# Patient Record
Sex: FEMALE | Race: BLACK OR AFRICAN AMERICAN | NOT HISPANIC OR LATINO | Employment: OTHER | URBAN - METROPOLITAN AREA
[De-identification: names, ages, dates, MRNs, and addresses within clinical notes are randomized per-mention and may not be internally consistent; named-entity substitution may affect disease eponyms.]

---

## 2023-04-17 ENCOUNTER — OFFICE VISIT (OUTPATIENT)
Dept: PRIMARY CARE | Facility: CLINIC | Age: 83
End: 2023-04-17
Payer: MEDICARE

## 2023-04-17 VITALS
OXYGEN SATURATION: 98 % | HEART RATE: 83 BPM | TEMPERATURE: 96 F | WEIGHT: 141.5 LBS | BODY MASS INDEX: 26.04 KG/M2 | DIASTOLIC BLOOD PRESSURE: 70 MMHG | SYSTOLIC BLOOD PRESSURE: 132 MMHG | RESPIRATION RATE: 16 BRPM | HEIGHT: 62 IN

## 2023-04-17 DIAGNOSIS — Z00.00 ROUTINE GENERAL MEDICAL EXAMINATION AT HEALTH CARE FACILITY: Primary | ICD-10-CM

## 2023-04-17 DIAGNOSIS — Z13.89 ENCOUNTER FOR SCREENING FOR OTHER DISORDER: ICD-10-CM

## 2023-04-17 DIAGNOSIS — Z78.0 ASYMPTOMATIC MENOPAUSE: ICD-10-CM

## 2023-04-17 DIAGNOSIS — Z00.00 ENCOUNTER FOR MEDICARE ANNUAL WELLNESS EXAM: ICD-10-CM

## 2023-04-17 DIAGNOSIS — N18.31 CHRONIC KIDNEY DISEASE, STAGE 3A (MULTI): ICD-10-CM

## 2023-04-17 DIAGNOSIS — M25.579 ANKLE PAIN, UNSPECIFIED CHRONICITY, UNSPECIFIED LATERALITY: ICD-10-CM

## 2023-04-17 PROBLEM — H90.3 BILATERAL HIGH FREQUENCY SENSORINEURAL HEARING LOSS: Status: ACTIVE | Noted: 2023-04-17

## 2023-04-17 PROBLEM — M99.09 SEGMENTAL AND SOMATIC DYSFUNCTION: Status: ACTIVE | Noted: 2023-04-17

## 2023-04-17 PROBLEM — M79.9 POSTURAL STRAIN: Status: ACTIVE | Noted: 2023-04-17

## 2023-04-17 PROBLEM — M53.3 SACROILIAC DYSFUNCTION: Status: ACTIVE | Noted: 2023-04-17

## 2023-04-17 PROBLEM — M79.18 DIFFUSE MYOFASCIAL PAIN SYNDROME: Status: ACTIVE | Noted: 2023-04-17

## 2023-04-17 PROBLEM — M19.049 ARTHROPATHY OF HAND: Status: ACTIVE | Noted: 2023-04-17

## 2023-04-17 PROBLEM — E78.5 HLD (HYPERLIPIDEMIA): Status: ACTIVE | Noted: 2023-04-17

## 2023-04-17 PROBLEM — M85.80 OSTEOPENIA: Status: ACTIVE | Noted: 2023-04-17

## 2023-04-17 ASSESSMENT — PATIENT HEALTH QUESTIONNAIRE - PHQ9
SUM OF ALL RESPONSES TO PHQ9 QUESTIONS 1 AND 2: 0
2. FEELING DOWN, DEPRESSED OR HOPELESS: NOT AT ALL
1. LITTLE INTEREST OR PLEASURE IN DOING THINGS: NOT AT ALL

## 2023-04-17 ASSESSMENT — ACTIVITIES OF DAILY LIVING (ADL)
MANAGING_FINANCES: INDEPENDENT
DRESSING: INDEPENDENT
TAKING_MEDICATION: INDEPENDENT
DOING_HOUSEWORK: INDEPENDENT
GROCERY_SHOPPING: INDEPENDENT
BATHING: INDEPENDENT

## 2023-04-17 ASSESSMENT — LIFESTYLE VARIABLES: HOW MANY STANDARD DRINKS CONTAINING ALCOHOL DO YOU HAVE ON A TYPICAL DAY: PATIENT DOES NOT DRINK

## 2023-04-17 NOTE — PROGRESS NOTES
"Subjective   Reason for Visit: Jovanna Bucio is an 82 y.o. female here for a Medicare Wellness visit.     Past Medical, Surgical, and Family History reviewed and updated in chart.      Reviewed all medications by prescribing practitioner or clinical pharmacist (such as prescriptions, OTCs, herbal therapies and supplements) and documented in the medical record.    HPI    Ankle pain- harvey while jump  Trying to work on ankle home exercise  Followed by podiatry who checked dopplers and were ok  Would like to see foot and ankle team for her ankle pain    Right leg pain  Worsened when sleeping on the couch  Today has resolved      Patient Care Team:  Khloe Hawkins DO as PCP - General  Khloe Hawkins DO as PCP - [facility] Attributed Provider     Review of Systems  All systems reviewed and neg if not noted in the HPI above   Objective   Vitals:  /70 (Patient Position: Sitting)   Pulse 83   Temp 35.6 °C (96 °F)   Resp 16   Ht 1.575 m (5' 2\")   Wt 64.2 kg (141 lb 8 oz)   SpO2 98%   BMI 25.88 kg/m²       Physical Exam      Pleasant  Eyes: conjunctiva non-icteric and eye lids are without obvious rash or drooping. Pupils are symmetric.   Ears, Nose, Mouth, and Throat: External ears and nose appear to be without deformity or rash. No lesions or masses noted. Hearing is grossly intact.   CV: RRR, no murmur  Carotids: no bruits  Pulm:CTA B/L  Abd: soft, NTTP, + BS  LE: no edema  Psychiatric: Alert, orientation to person, place, and time. Recent/remote memory as evidenced through face-to-face interaction and discussion appear grossly intact. Mood and affect are normal.          Assessment/Plan   Problem List Items Addressed This Visit          Genitourinary    Chronic kidney disease, stage 3a    Current Assessment & Plan     Stable. Continue to monitor  Checking today         Relevant Orders    CBC and Auto Differential     Other Visit Diagnoses       Routine general medical examination at health care facility   "  -  Primary    Encounter for Medicare annual wellness exam        Relevant Orders    CBC and Auto Differential    Comprehensive Metabolic Panel    Lipid Panel    TSH with reflex to Free T4 if abnormal    Referral to Community Health Worker - [facility]    Encounter for screening for other disorder        Ankle pain, unspecified chronicity, unspecified laterality        Relevant Orders    Referral to Podiatry    Asymptomatic menopause        Relevant Orders    XR DEXA bone density

## 2023-04-17 NOTE — PATIENT INSTRUCTIONS
Hand numbness  - sounds like carpel tunnel  - try otc splint    Ankle pain  - referral for podiatry    Labs today    IBS???  - continue to monitor  - whole food plant based diet can help        Continue to work on healthy diet and exercise  We encourage all patients to engage in exercise 150 minutes per week   Adults 18 and older, activity during each week - if you are able:    Engage in at least 150 minutes of moderate or vigorous exercise per week   If you are able- Engage in muscle strengthening activity on 2 or more days per week    Ordered labs- due after your birthday      Please follow up in 1yr for medicare wellness or as needed.       ** If labs or imaging ordered at today's visit, all the non-urgent results will be discussed at your next visit    If you have been referred for a special test or to a specialist please call  [phone] to schedule an appointment.  If you have any further questions, or if develop new or worsened symptoms, please give our office a call at [phone].

## 2023-04-17 NOTE — PROGRESS NOTES
CHW l/m for pt to return the call concerning her SDOH referral for internet help. CHW will sign pt up with [employer] if she want the internet assistance. CHW will assist pt when she return the call.

## 2023-04-18 NOTE — PROGRESS NOTES
Pt returned the call to CHW and gave permission to sign her up for [employer]. CHW signed pt up and explained to her if she have any questions she can give CHW a call.

## 2023-08-28 ENCOUNTER — LAB (OUTPATIENT)
Dept: LAB | Facility: LAB | Age: 83
End: 2023-08-28
Payer: MEDICARE

## 2023-08-28 DIAGNOSIS — N18.31 CHRONIC KIDNEY DISEASE, STAGE 3A (MULTI): ICD-10-CM

## 2023-08-28 DIAGNOSIS — Z00.00 ENCOUNTER FOR MEDICARE ANNUAL WELLNESS EXAM: ICD-10-CM

## 2023-08-28 LAB
ALANINE AMINOTRANSFERASE (SGPT) (U/L) IN SER/PLAS: 12 U/L (ref 7–45)
ALBUMIN (G/DL) IN SER/PLAS: 4.2 G/DL (ref 3.4–5)
ALKALINE PHOSPHATASE (U/L) IN SER/PLAS: 60 U/L (ref 33–136)
ANION GAP IN SER/PLAS: 15 MMOL/L (ref 10–20)
ASPARTATE AMINOTRANSFERASE (SGOT) (U/L) IN SER/PLAS: 22 U/L (ref 9–39)
BASOPHILS (10*3/UL) IN BLOOD BY AUTOMATED COUNT: 0.03 X10E9/L (ref 0–0.1)
BASOPHILS/100 LEUKOCYTES IN BLOOD BY AUTOMATED COUNT: 0.8 % (ref 0–2)
BILIRUBIN TOTAL (MG/DL) IN SER/PLAS: 0.6 MG/DL (ref 0–1.2)
CALCIUM (MG/DL) IN SER/PLAS: 9.6 MG/DL (ref 8.6–10.6)
CARBON DIOXIDE, TOTAL (MMOL/L) IN SER/PLAS: 24 MMOL/L (ref 21–32)
CHLORIDE (MMOL/L) IN SER/PLAS: 104 MMOL/L (ref 98–107)
CHOLESTEROL (MG/DL) IN SER/PLAS: 177 MG/DL (ref 0–199)
CHOLESTEROL IN HDL (MG/DL) IN SER/PLAS: 75.7 MG/DL
CHOLESTEROL/HDL RATIO: 2.3
CREATININE (MG/DL) IN SER/PLAS: 1.02 MG/DL (ref 0.5–1.05)
EOSINOPHILS (10*3/UL) IN BLOOD BY AUTOMATED COUNT: 0.05 X10E9/L (ref 0–0.4)
EOSINOPHILS/100 LEUKOCYTES IN BLOOD BY AUTOMATED COUNT: 1.3 % (ref 0–6)
ERYTHROCYTE DISTRIBUTION WIDTH (RATIO) BY AUTOMATED COUNT: 12.8 % (ref 11.5–14.5)
ERYTHROCYTE MEAN CORPUSCULAR HEMOGLOBIN CONCENTRATION (G/DL) BY AUTOMATED: 31.8 G/DL (ref 32–36)
ERYTHROCYTE MEAN CORPUSCULAR VOLUME (FL) BY AUTOMATED COUNT: 97 FL (ref 80–100)
ERYTHROCYTES (10*6/UL) IN BLOOD BY AUTOMATED COUNT: 4.14 X10E12/L (ref 4–5.2)
GFR FEMALE: 54 ML/MIN/1.73M2
GLUCOSE (MG/DL) IN SER/PLAS: 85 MG/DL (ref 74–99)
HEMATOCRIT (%) IN BLOOD BY AUTOMATED COUNT: 40 % (ref 36–46)
HEMOGLOBIN (G/DL) IN BLOOD: 12.7 G/DL (ref 12–16)
IMMATURE GRANULOCYTES/100 LEUKOCYTES IN BLOOD BY AUTOMATED COUNT: 0 % (ref 0–0.9)
LDL: 90 MG/DL (ref 0–99)
LEUKOCYTES (10*3/UL) IN BLOOD BY AUTOMATED COUNT: 3.8 X10E9/L (ref 4.4–11.3)
LYMPHOCYTES (10*3/UL) IN BLOOD BY AUTOMATED COUNT: 1.58 X10E9/L (ref 0.8–3)
LYMPHOCYTES/100 LEUKOCYTES IN BLOOD BY AUTOMATED COUNT: 41.9 % (ref 13–44)
MONOCYTES (10*3/UL) IN BLOOD BY AUTOMATED COUNT: 0.34 X10E9/L (ref 0.05–0.8)
MONOCYTES/100 LEUKOCYTES IN BLOOD BY AUTOMATED COUNT: 9 % (ref 2–10)
NEUTROPHILS (10*3/UL) IN BLOOD BY AUTOMATED COUNT: 1.77 X10E9/L (ref 1.6–5.5)
NEUTROPHILS/100 LEUKOCYTES IN BLOOD BY AUTOMATED COUNT: 47 % (ref 40–80)
NRBC (PER 100 WBCS) BY AUTOMATED COUNT: 0 /100 WBC (ref 0–0)
PLATELETS (10*3/UL) IN BLOOD AUTOMATED COUNT: 186 X10E9/L (ref 150–450)
POTASSIUM (MMOL/L) IN SER/PLAS: 3.8 MMOL/L (ref 3.5–5.3)
PROTEIN TOTAL: 7.2 G/DL (ref 6.4–8.2)
SODIUM (MMOL/L) IN SER/PLAS: 139 MMOL/L (ref 136–145)
THYROTROPIN (MIU/L) IN SER/PLAS BY DETECTION LIMIT <= 0.05 MIU/L: 2.86 MIU/L (ref 0.44–3.98)
TRIGLYCERIDE (MG/DL) IN SER/PLAS: 58 MG/DL (ref 0–149)
UREA NITROGEN (MG/DL) IN SER/PLAS: 11 MG/DL (ref 6–23)
VLDL: 12 MG/DL (ref 0–40)

## 2023-08-28 PROCEDURE — 85025 COMPLETE CBC W/AUTO DIFF WBC: CPT

## 2023-08-28 PROCEDURE — 80061 LIPID PANEL: CPT

## 2023-08-28 PROCEDURE — 80053 COMPREHEN METABOLIC PANEL: CPT

## 2023-08-28 PROCEDURE — 84443 ASSAY THYROID STIM HORMONE: CPT

## 2023-08-28 PROCEDURE — 36415 COLL VENOUS BLD VENIPUNCTURE: CPT

## 2023-10-24 PROBLEM — Z78.0 ASYMPTOMATIC MENOPAUSE: Status: ACTIVE | Noted: 2023-10-24

## 2023-10-24 RX ORDER — VIT C/E/ZN/COPPR/LUTEIN/ZEAXAN 250MG-90MG
CAPSULE ORAL
COMMUNITY
Start: 2009-05-11

## 2023-10-24 RX ORDER — DICLOFENAC SODIUM 10 MG/G
GEL TOPICAL
COMMUNITY

## 2023-10-24 NOTE — PROGRESS NOTES
Subjective   Patient ID: Jovanna Bucio is a 83 y.o. female who presents October 25, 2023 for .    MCR    Today, the patient rates their degree of pain as a 2 out of 10 on the numeric pain rating scale.     HPI : Jovanna returns to my office for chiropractic care. She denies new trauma/incident. She reports feeling quite sick toward the end of summer when attending a wellness retreat that involved dietary changes and a cleanse. Reports that she has been feeling better overall since this time. Notes very occasional hip or lower back discomfort. No radicular complaints. She has continued to stay active.      Objective   Physical Exam  Neurological:      General: No focal deficit present.      Mental Status: She is alert and oriented to person, place, and time.      Cranial Nerves: No dysarthria or facial asymmetry.      Sensory: Sensation is intact.      Motor: Motor function is intact.      Coordination: Coordination is intact.      Gait: Gait is intact. Gait normal.         Palpation of the following region(s) revealed:  Cervical: Upper trapezius bilateral, muscular hypertonicity.  Cervical paraspinals bilateral, muscular hypertonicity.  Thoracic: Thoracic paraspinals bilateral, muscular hypertonicity.  Rhomboids bilateral, muscular hypertonicity.  Lumbar: Lumbar paraspinals right, muscular hypertonicity.  Quadratus lumborum right, muscular hypertonicity.  Gluteal bilateral, muscular hypertonicity.        Segmental Joint(s): Segmental joint dysfunction was assessed with motion palpation and is identified in the following areas:  Cervical : C2 C5  Thoracic : T3, T4, and T7  Lumbopelvic / Sacral SIJ : L5 and R SIJ      Assessment/Plan   Today's Treatment Included: Chiropractic manipulation to the Segmental Joint(s) Cervical : C2 C5 Segmental Joint(s) Lumbopelvic/Sacral SIJ : L5 and R SIJ Segmental Joint(s) Thoracic : T3, T4, and T7   Treatment Techniques Used : Activator/Tool assisted technique and Pelvic drop  table technique    Soft-tissue mobilization was performed in the following areas:   Cervical paraspinal mm. bilateral and Upper Trapezius bilateral  Middle Trapezius bilateral, Rhomboids bilateral, and Pectoralis bilateral  Lumbar Paraspinal mm. right, Quadratus Lumborum right, and Gluteal mm. Glute. Med. bilateral            F/U in 4-6 weeks    The patient tolerated today's treatment with little or no additional discomfort and was instructed to contact the office for questions or concerns.

## 2023-10-25 ENCOUNTER — ALLIED HEALTH (OUTPATIENT)
Dept: INTEGRATIVE MEDICINE | Facility: CLINIC | Age: 83
End: 2023-10-25
Payer: MEDICARE

## 2023-10-25 DIAGNOSIS — M53.3 SACROILIAC DYSFUNCTION: ICD-10-CM

## 2023-10-25 DIAGNOSIS — M99.01 SEGMENTAL DYSFUNCTION OF CERVICAL REGION: ICD-10-CM

## 2023-10-25 DIAGNOSIS — M99.02 SEGMENTAL AND SOMATIC DYSFUNCTION OF THORACIC REGION: ICD-10-CM

## 2023-10-25 DIAGNOSIS — M79.9 POSTURAL STRAIN: ICD-10-CM

## 2023-10-25 DIAGNOSIS — M99.03 SEGMENTAL AND SOMATIC DYSFUNCTION OF LUMBAR REGION: Primary | ICD-10-CM

## 2023-10-25 PROCEDURE — 98941 CHIROPRACT MANJ 3-4 REGIONS: CPT | Performed by: CHIROPRACTOR

## 2023-11-20 ENCOUNTER — ALLIED HEALTH (OUTPATIENT)
Dept: INTEGRATIVE MEDICINE | Facility: CLINIC | Age: 83
End: 2023-11-20
Payer: MEDICARE

## 2023-11-20 DIAGNOSIS — M99.01 SEGMENTAL DYSFUNCTION OF CERVICAL REGION: ICD-10-CM

## 2023-11-20 DIAGNOSIS — M99.02 SEGMENTAL AND SOMATIC DYSFUNCTION OF THORACIC REGION: ICD-10-CM

## 2023-11-20 DIAGNOSIS — M99.03 SEGMENTAL AND SOMATIC DYSFUNCTION OF LUMBAR REGION: Primary | ICD-10-CM

## 2023-11-20 DIAGNOSIS — M79.9 POSTURAL STRAIN: ICD-10-CM

## 2023-11-20 DIAGNOSIS — M53.3 SACROILIAC DYSFUNCTION: ICD-10-CM

## 2023-11-20 PROCEDURE — 98941 CHIROPRACT MANJ 3-4 REGIONS: CPT | Performed by: CHIROPRACTOR

## 2023-11-20 NOTE — PROGRESS NOTES
Subjective   Patient ID: Jovanna Bucio is a 83 y.o. female who presents November 20, 2023 for chiropractic care.    MCR    Today, the patient rates their degree of pain as a 2 out of 10 on the numeric pain rating scale.     HPI : Jovanna returns to my office for chiropractic care. She has continued to stay active by walking outdoors regularly, doing home exercises and using her personal trampoline at home. She reports occasional hip and/or lower back discomfort. There are no radicular complaints. Denies new trauma/incident.    Objective   Physical Exam  Neurological:      General: No focal deficit present.      Mental Status: She is alert and oriented to person, place, and time.      Cranial Nerves: No dysarthria or facial asymmetry.      Sensory: Sensation is intact.      Motor: Motor function is intact.      Coordination: Coordination is intact.      Gait: Gait is intact. Gait normal.         Palpation of the following region(s) revealed:  Cervical: Upper trapezius bilateral, muscular hypertonicity.  Cervical paraspinals bilateral, muscular hypertonicity.  Thoracic: Thoracic paraspinals bilateral, muscular hypertonicity.  Rhomboids bilateral, muscular hypertonicity.  Lumbar: Lumbar paraspinals right, muscular hypertonicity.  Quadratus lumborum right, muscular hypertonicity.  Gluteal bilateral, muscular hypertonicity.        Segmental Joint(s): Segmental joint dysfunction was assessed with motion palpation and is identified in the following areas:  Cervical : C2 C5  Thoracic : T3, T4, and T7  Lumbopelvic / Sacral SIJ : L5 and R SIJ      Assessment/Plan   Today's Treatment Included: Chiropractic manipulation to the Segmental Joint(s) Cervical : C2 C5 Segmental Joint(s) Lumbopelvic/Sacral SIJ : L5 and R SIJ Segmental Joint(s) Thoracic : T3, T4, and T7   Treatment Techniques Used : Activator/Tool assisted technique and Pelvic drop table technique    Soft-tissue mobilization was performed in the following  areas:   Cervical paraspinal mm. bilateral and Upper Trapezius bilateral  Middle Trapezius bilateral, Rhomboids bilateral, and Pectoralis bilateral  Lumbar Paraspinal mm. bilateral, Quadratus Lumborum bilateral, and Gluteal mm. Glute. Med. bilateral      F/U in 4-6 weeks    The patient tolerated today's treatment with little or no additional discomfort and was instructed to contact the office for questions or concerns.

## 2023-12-19 NOTE — PROGRESS NOTES
Subjective   Patient ID: Jovanna Bcuio is a 83 y.o. female who presents December 19, 2023 for chiropractic care.    MCR    Today, the patient rates their degree of pain as a 1 out of 10 on the numeric pain rating scale.     HPI : Jovanna returns to my office for chiropractic care. She continues to feel quite well overall and remains as active as she can throughout the winter months by walking regularly. She has found chiropractic care helpful in maintaining mobility and functionality. She does note occasional hip and/or lower back discomfort. No radicular complaints. Denies new trauma/incident.    Objective   Physical Exam  Neurological:      General: No focal deficit present.      Mental Status: She is alert and oriented to person, place, and time.      Cranial Nerves: No dysarthria or facial asymmetry.      Sensory: Sensation is intact.      Motor: Motor function is intact.      Coordination: Coordination is intact.      Gait: Gait is intact. Gait normal.         Palpation of the following region(s) revealed:  Cervical: Upper trapezius bilateral, muscular hypertonicity.  Cervical paraspinals bilateral, muscular hypertonicity.  Thoracic: Thoracic paraspinals bilateral, muscular hypertonicity.  Rhomboids bilateral, muscular hypertonicity.  Lumbar: Lumbar paraspinals right, muscular hypertonicity.  Quadratus lumborum right, muscular hypertonicity.  Gluteal bilateral, muscular hypertonicity.        Segmental Joint(s): Segmental joint dysfunction was assessed with motion palpation and is identified in the following areas:  Cervical : C2 C5  Thoracic : T3, T4, and T7  Lumbopelvic / Sacral SIJ : L5 and R SIJ      Assessment/Plan   Today's Treatment Included: Chiropractic manipulation to the Segmental Joint(s) Cervical : C2 C5 Segmental Joint(s) Lumbopelvic/Sacral SIJ : L5 and R SIJ Segmental Joint(s) Thoracic : T3, T4, and T7   Treatment Techniques Used : Activator/Tool assisted technique and Pelvic drop table  technique    Soft-tissue mobilization was performed in the following areas:   Cervical paraspinal mm. bilateral and Upper Trapezius bilateral  Middle Trapezius bilateral, Rhomboids bilateral, and Pectoralis bilateral  Lumbar Paraspinal mm. bilateral, Quadratus Lumborum bilateral, and Gluteal mm. Glute. Med. bilateral      F/U in 4-6 weeks    The patient tolerated today's treatment with little or no additional discomfort and was instructed to contact the office for questions or concerns.

## 2023-12-20 ENCOUNTER — ALLIED HEALTH (OUTPATIENT)
Dept: INTEGRATIVE MEDICINE | Facility: CLINIC | Age: 83
End: 2023-12-20
Payer: MEDICARE

## 2023-12-20 DIAGNOSIS — M99.02 SEGMENTAL AND SOMATIC DYSFUNCTION OF THORACIC REGION: ICD-10-CM

## 2023-12-20 DIAGNOSIS — M79.9 POSTURAL STRAIN: ICD-10-CM

## 2023-12-20 DIAGNOSIS — M99.01 SEGMENTAL DYSFUNCTION OF CERVICAL REGION: ICD-10-CM

## 2023-12-20 DIAGNOSIS — M53.3 SACROILIAC DYSFUNCTION: ICD-10-CM

## 2023-12-20 DIAGNOSIS — M99.03 SEGMENTAL AND SOMATIC DYSFUNCTION OF LUMBAR REGION: Primary | ICD-10-CM

## 2023-12-20 PROCEDURE — 98941 CHIROPRACT MANJ 3-4 REGIONS: CPT | Performed by: CHIROPRACTOR

## 2024-01-31 ENCOUNTER — ALLIED HEALTH (OUTPATIENT)
Dept: INTEGRATIVE MEDICINE | Facility: CLINIC | Age: 84
End: 2024-01-31
Payer: MEDICARE

## 2024-01-31 DIAGNOSIS — M99.03 SEGMENTAL AND SOMATIC DYSFUNCTION OF LUMBAR REGION: Primary | ICD-10-CM

## 2024-01-31 DIAGNOSIS — M79.9 POSTURAL STRAIN: ICD-10-CM

## 2024-01-31 DIAGNOSIS — M99.02 SEGMENTAL AND SOMATIC DYSFUNCTION OF THORACIC REGION: ICD-10-CM

## 2024-01-31 DIAGNOSIS — M99.01 SEGMENTAL DYSFUNCTION OF CERVICAL REGION: ICD-10-CM

## 2024-01-31 DIAGNOSIS — M53.3 SACROILIAC DYSFUNCTION: ICD-10-CM

## 2024-01-31 PROCEDURE — 98941 CHIROPRACT MANJ 3-4 REGIONS: CPT | Performed by: CHIROPRACTOR

## 2024-01-31 NOTE — PROGRESS NOTES
Subjective   Patient ID: Jovanna Bucio is a 83 y.o. female who presents January 31, 2024 for chiropractic care.    Medicare    Today, the patient rates their degree of pain as a 1 out of 10 on the numeric pain rating scale.     Jovanna returns for continued chiropractic care. She reports that she continues to do well. She states that she has remained active and finds that movement helps her manage her symptoms. The patient denies any changes in health since her last encounter and will follow up as scheduled.      Objective   Physical Exam  Neurological:      General: No focal deficit present.      Mental Status: She is alert and oriented to person, place, and time.      Cranial Nerves: No dysarthria or facial asymmetry.      Sensory: Sensation is intact.      Motor: Motor function is intact.      Coordination: Coordination is intact.      Gait: Gait is intact.       Palpation of the following region(s) revealed:  Cervical: Scalenes bilateral, muscular hypertonicity.  Upper trapezius bilateral, muscular hypertonicity.  Levator scapulae bilateral, muscular hypertonicity.  Cervical paraspinals bilateral, muscular hypertonicity.  Thoracic: Thoracic paraspinals bilateral, muscular hypertonicity.  Middle trapezius bilateral, muscular hypertonicity.  Rhomboids bilateral, muscular hypertonicity.  Lumbar: Lumbar paraspinals bilateral, muscular hypertonicity.  Gluteal bilateral, muscular hypertonicity.  Psoas bilateral, muscular hypertonicity.        Segmental Joint(s): Segmental joint dysfunction was assessed with motion palpation and is identified in the following areas:  Cervical : C2 C4  Thoracic : T4, T5, and T8  Lumbopelvic / Sacral SIJ : L5/S1, R SIJ, and L SIJ    Assessment/Plan   Today's Treatment Included: Chiropractic manipulation to the Segmental Joint(s) Cervical : C2 C4 Segmental Joint(s) Lumbopelvic/Sacral SIJ : L5/S1, R SIJ, and L SIJ Segmental Joint(s) Thoracic : T4, T5, and T8   Treatment Techniques  Used : Activator/Tool assisted technique and Pelvic drop table technique  Pin and stretch    Soft-tissue mobilization was performed in the following areas:   Cervical paraspinal mm. bilateral, Scalenes bilateral, Upper Trapezius bilateral, and Levator Scap. bilateral  Thoracic Paraspinal mm. bilateral, Middle Trapezius bilateral, and Rhomboids bilateral  Lumbar Paraspinal mm. bilateral, Gluteal mm. Glute. Max.  and Glute. Med. bilateral, Piriformis bilateral, and Psoas bilateral            The patient tolerated today's treatment with little or no additional discomfort and was instructed to contact the office for questions or concerns.

## 2024-02-14 ENCOUNTER — ALLIED HEALTH (OUTPATIENT)
Dept: INTEGRATIVE MEDICINE | Facility: CLINIC | Age: 84
End: 2024-02-14
Payer: MEDICARE

## 2024-02-14 DIAGNOSIS — M99.02 SEGMENTAL AND SOMATIC DYSFUNCTION OF THORACIC REGION: ICD-10-CM

## 2024-02-14 DIAGNOSIS — M99.03 SEGMENTAL AND SOMATIC DYSFUNCTION OF LUMBAR REGION: Primary | ICD-10-CM

## 2024-02-14 DIAGNOSIS — M99.01 SEGMENTAL DYSFUNCTION OF CERVICAL REGION: ICD-10-CM

## 2024-02-14 DIAGNOSIS — M53.3 SACROILIAC DYSFUNCTION: ICD-10-CM

## 2024-02-14 DIAGNOSIS — M79.9 POSTURAL STRAIN: ICD-10-CM

## 2024-02-14 PROCEDURE — 98941 CHIROPRACT MANJ 3-4 REGIONS: CPT | Performed by: CHIROPRACTOR

## 2024-02-14 NOTE — PROGRESS NOTES
Subjective   Patient ID: Jovanna Bucio is a 83 y.o. female who presents February 14, 2024 for chiropractic care.    Medicare    Today, the patient rates their degree of pain as a 1 out of 10 on the numeric pain rating scale.     Jovanna returns for continued chiropractic care. She states that she has been doing well. She reports that she has had some tightness in the low back and that she has started working on her squats while at home. The patient denies any changes in health since her last encounter and will follow up as scheduled.      Objective   Physical Exam  Neurological:      General: No focal deficit present.      Mental Status: She is alert and oriented to person, place, and time.      Cranial Nerves: No dysarthria or facial asymmetry.      Sensory: Sensation is intact.      Motor: Motor function is intact.      Coordination: Coordination is intact.      Gait: Gait is intact.       Palpation of the following region(s) revealed:  Cervical: Scalenes bilateral, muscular hypertonicity.  Upper trapezius bilateral, muscular hypertonicity.  Levator scapulae bilateral, muscular hypertonicity.  Cervical paraspinals bilateral, muscular hypertonicity.  Thoracic: Thoracic paraspinals bilateral, muscular hypertonicity.  Middle trapezius bilateral, muscular hypertonicity.  Rhomboids bilateral, muscular hypertonicity.  Lumbar: Lumbar paraspinals bilateral, muscular hypertonicity.  Gluteal bilateral, muscular hypertonicity.  Psoas bilateral, muscular hypertonicity.    Segmental Joint(s): Segmental joint dysfunction was assessed with motion palpation and is identified in the following areas:  Cervical : C2 C4  Thoracic : T4, T5, and T8  Lumbopelvic / Sacral SIJ : L5/S1, R SIJ, and L SIJ    Assessment/Plan   Today's Treatment Included: Chiropractic manipulation to the Segmental Joint(s) Cervical : C2 C4 Segmental Joint(s) Lumbopelvic/Sacral SIJ : L5/S1, R SIJ, and L SIJ Segmental Joint(s) Thoracic : T4, T5, and T8    Treatment Techniques Used : Activator/Tool assisted technique and Pelvic drop table technique  Pin and stretch    Soft-tissue mobilization was performed in the following areas:   Cervical paraspinal mm. bilateral, Scalenes bilateral, Upper Trapezius bilateral, and Levator Scap. bilateral  Thoracic Paraspinal mm. bilateral, Middle Trapezius bilateral, and Rhomboids bilateral  Lumbar Paraspinal mm. bilateral, Gluteal mm. Glute. Max.  and Glute. Med. bilateral, Piriformis bilateral, and Psoas bilateral    The patient tolerated today's treatment with little or no additional discomfort and was instructed to contact the office for questions or concerns.

## 2024-03-07 NOTE — PROGRESS NOTES
Subjective   Patient ID: Jovanna Bucio is a 83 y.o. female who presents March 13, 2024 for chiropractic care.    Medicare    Today, the patient rates their degree of pain as a 1 out of 10 on the numeric pain rating scale.     Jovanna returns for continued chiropractic care. She states that she has been doing well. She states that she continues to work on her squats and that she has been feeling good while exercising. The patient denies any changes in health since her last encounter and will follow up as scheduled.      Objective   Physical Exam  Neurological:      General: No focal deficit present.      Mental Status: She is alert and oriented to person, place, and time.      Cranial Nerves: No dysarthria or facial asymmetry.      Sensory: Sensation is intact.      Motor: Motor function is intact.      Coordination: Coordination is intact.      Gait: Gait is intact.       Palpation of the following region(s) revealed:  Cervical: Scalenes bilateral, muscular hypertonicity.  Upper trapezius bilateral, muscular hypertonicity.  Levator scapulae bilateral, muscular hypertonicity.  Cervical paraspinals bilateral, muscular hypertonicity.  Thoracic: Thoracic paraspinals bilateral, muscular hypertonicity.  Middle trapezius bilateral, muscular hypertonicity.  Rhomboids bilateral, muscular hypertonicity.  Lumbar: Lumbar paraspinals bilateral, muscular hypertonicity.  Gluteal bilateral, muscular hypertonicity.  Psoas bilateral, muscular hypertonicity.    Segmental Joint(s): Segmental joint dysfunction was assessed with motion palpation and is identified in the following areas:  Cervical : C2 C4  Thoracic : T4, T5, and T8  Lumbopelvic / Sacral SIJ : L5/S1, R SIJ, and L SIJ    Assessment/Plan   Today's Treatment Included: Chiropractic manipulation to the Segmental Joint(s) Cervical : C2 C4 Segmental Joint(s) Lumbopelvic/Sacral SIJ : L5/S1, R SIJ, and L SIJ Segmental Joint(s) Thoracic : T4, T5, and T8   Treatment  Techniques Used : Activator/Tool assisted technique and Pelvic drop table technique  Pin and stretch    Soft-tissue mobilization was performed in the following areas:   Cervical paraspinal mm. bilateral, Scalenes bilateral, Upper Trapezius bilateral, and Levator Scap. bilateral  Thoracic Paraspinal mm. bilateral, Middle Trapezius bilateral, and Rhomboids bilateral  Lumbar Paraspinal mm. bilateral, Gluteal mm. Glute. Max.  and Glute. Med. bilateral, Piriformis bilateral, and Psoas bilateral    The patient tolerated today's treatment with little or no additional discomfort and was instructed to contact the office for questions or concerns.

## 2024-03-13 ENCOUNTER — ALLIED HEALTH (OUTPATIENT)
Dept: INTEGRATIVE MEDICINE | Facility: CLINIC | Age: 84
End: 2024-03-13
Payer: MEDICARE

## 2024-03-13 DIAGNOSIS — M99.03 SEGMENTAL AND SOMATIC DYSFUNCTION OF LUMBAR REGION: Primary | ICD-10-CM

## 2024-03-13 DIAGNOSIS — M79.9 POSTURAL STRAIN: ICD-10-CM

## 2024-03-13 DIAGNOSIS — M53.3 SACROILIAC DYSFUNCTION: ICD-10-CM

## 2024-03-13 DIAGNOSIS — M99.02 SEGMENTAL AND SOMATIC DYSFUNCTION OF THORACIC REGION: ICD-10-CM

## 2024-03-13 DIAGNOSIS — M99.01 SEGMENTAL DYSFUNCTION OF CERVICAL REGION: ICD-10-CM

## 2024-03-13 PROCEDURE — 98941 CHIROPRACT MANJ 3-4 REGIONS: CPT | Performed by: CHIROPRACTOR

## 2024-04-22 ENCOUNTER — OFFICE VISIT (OUTPATIENT)
Dept: PRIMARY CARE | Facility: CLINIC | Age: 84
End: 2024-04-22
Payer: MEDICARE

## 2024-04-22 ENCOUNTER — LAB (OUTPATIENT)
Dept: LAB | Facility: LAB | Age: 84
End: 2024-04-22
Payer: MEDICARE

## 2024-04-22 VITALS
HEIGHT: 62 IN | OXYGEN SATURATION: 99 % | BODY MASS INDEX: 24.49 KG/M2 | HEART RATE: 107 BPM | TEMPERATURE: 98.2 F | DIASTOLIC BLOOD PRESSURE: 70 MMHG | WEIGHT: 133.1 LBS | RESPIRATION RATE: 12 BRPM | SYSTOLIC BLOOD PRESSURE: 138 MMHG

## 2024-04-22 DIAGNOSIS — N39.41 URGE INCONTINENCE OF URINE: ICD-10-CM

## 2024-04-22 DIAGNOSIS — M79.672 PAIN IN BOTH FEET: ICD-10-CM

## 2024-04-22 DIAGNOSIS — Z00.00 ENCOUNTER FOR MEDICARE ANNUAL WELLNESS EXAM: Primary | ICD-10-CM

## 2024-04-22 DIAGNOSIS — N18.31 CHRONIC KIDNEY DISEASE, STAGE 3A (MULTI): ICD-10-CM

## 2024-04-22 DIAGNOSIS — Z13.89 ENCOUNTER FOR SCREENING FOR OTHER DISORDER: ICD-10-CM

## 2024-04-22 DIAGNOSIS — Z91.81 AT RISK FOR FALLING: ICD-10-CM

## 2024-04-22 DIAGNOSIS — R26.89 POOR BALANCE: ICD-10-CM

## 2024-04-22 DIAGNOSIS — M79.671 PAIN IN BOTH FEET: ICD-10-CM

## 2024-04-22 PROCEDURE — 81001 URINALYSIS AUTO W/SCOPE: CPT

## 2024-04-22 PROCEDURE — 1160F RVW MEDS BY RX/DR IN RCRD: CPT | Performed by: FAMILY MEDICINE

## 2024-04-22 PROCEDURE — 87086 URINE CULTURE/COLONY COUNT: CPT

## 2024-04-22 PROCEDURE — 1036F TOBACCO NON-USER: CPT | Performed by: FAMILY MEDICINE

## 2024-04-22 PROCEDURE — 99215 OFFICE O/P EST HI 40 MIN: CPT | Performed by: FAMILY MEDICINE

## 2024-04-22 PROCEDURE — 80069 RENAL FUNCTION PANEL: CPT

## 2024-04-22 PROCEDURE — 1170F FXNL STATUS ASSESSED: CPT | Performed by: FAMILY MEDICINE

## 2024-04-22 PROCEDURE — 1123F ACP DISCUSS/DSCN MKR DOCD: CPT | Performed by: FAMILY MEDICINE

## 2024-04-22 PROCEDURE — 36415 COLL VENOUS BLD VENIPUNCTURE: CPT

## 2024-04-22 PROCEDURE — G0446 INTENS BEHAVE THER CARDIO DX: HCPCS | Performed by: FAMILY MEDICINE

## 2024-04-22 PROCEDURE — G0444 DEPRESSION SCREEN ANNUAL: HCPCS | Performed by: FAMILY MEDICINE

## 2024-04-22 PROCEDURE — 1159F MED LIST DOCD IN RCRD: CPT | Performed by: FAMILY MEDICINE

## 2024-04-22 ASSESSMENT — ACTIVITIES OF DAILY LIVING (ADL)
DRESSING: INDEPENDENT
GROCERY_SHOPPING: INDEPENDENT
DOING_HOUSEWORK: INDEPENDENT
BATHING: INDEPENDENT
TAKING_MEDICATION: INDEPENDENT
MANAGING_FINANCES: INDEPENDENT

## 2024-04-22 ASSESSMENT — PATIENT HEALTH QUESTIONNAIRE - PHQ9
SUM OF ALL RESPONSES TO PHQ9 QUESTIONS 1 AND 2: 0
1. LITTLE INTEREST OR PLEASURE IN DOING THINGS: NOT AT ALL
2. FEELING DOWN, DEPRESSED OR HOPELESS: NOT AT ALL

## 2024-04-22 NOTE — PATIENT INSTRUCTIONS
Kidney levels  - Be on the look out for a call from the [facility] pharm   - rechecking labs today    Urination  - referral for uro/gyn team  - Call for appt    Foot pain  - referral for podiatry      Please follow up in  1 yr for medicare wellness  or as needed.       ** If labs or imaging ordered at today's visit, all the non-urgent results will be discussed at your next visit    If you have been referred for a special test or to a specialist please call  [phone] to schedule an appointment.  If you have any further questions, or if develop new or worsened symptoms, please give our office a call at [phone].

## 2024-04-22 NOTE — PROGRESS NOTES
"Subjective   Reason for Visit: Jovanna Bucio is an 83 y.o. female here for a Medicare Wellness visit.     Past Medical, Surgical, and Family History reviewed and updated in chart.    Reviewed all medications by prescribing practitioner or clinical pharmacist (such as prescriptions, OTCs, herbal therapies and supplements) and documented in the medical record.    HPI    Patient Care Team:  Khloe Hawkins DO as PCP - General     Has been with urination  urge  CKD 3- stable  Exercise daily  Has issues with balance- last fall was 3yrs ago,- would like to try PT for thisw  Has foot pain- seen by podiatry in the past- would like new referral ( retired)      Review of Systems   All systems reviewed and neg if not noted in the HPI above       Objective   Vitals:  /70 (Patient Position: Sitting)   Pulse 107   Temp 36.8 °C (98.2 °F)   Resp 12   Ht 1.575 m (5' 2\")   Wt 60.4 kg (133 lb 1.6 oz)   SpO2 99%   BMI 24.34 kg/m²       Physical Exam  Pleasant  Eyes: conjunctiva non-icteric and eye lids are without obvious rash or drooping. Pupils are symmetric.   Ears, Nose, Mouth, and Throat: External ears and nose appear to be without deformity or rash. No lesions or masses noted. Hearing is grossly intact.   CV: RRR, no murmur  Carotids: no bruits  Pulm:CTA B/L  Abd: soft, NTTP, + BS  LE: no edema  Psychiatric: Alert, orientation to person, place, and time. Recent/remote memory as evidenced through face-to-face interaction and discussion appear grossly intact. Mood and affect are normal.        Assessment/Plan   Problem List Items Addressed This Visit       Chronic kidney disease, stage 3a (Multi)    Current Assessment & Plan     GFR 54  Checking labs again- has been with plant base diet  Referral for APC         Relevant Orders    Follow Up In [facility] - Pharmacy    Renal function panel     Other Visit Diagnoses       Encounter for Medicare annual wellness exam    -  Primary    Encounter for " screening for other disorder        Pain in both feet        Relevant Orders    Referral to Podiatry    Poor balance        Relevant Orders    Referral to Physical Therapy    At risk for falling        Relevant Orders    Referral to Physical Therapy    Urge incontinence of urine        Relevant Orders    Referral to Urogynecology    Urine Culture    Urinalysis with Reflex Microscopic                   Depression Screening                 Depression screening completed using the PHQ-2 questions with results documented in the chart/encounter (15min)                 (See Rooming Screening section for documentation, and/or progress note for additional information)         Cardiac Risk Assessment         -------------Current ASCVD risk score: N/A                 Cardiovascular risk was discussed and, if needed, lifestyle modifications recommended, including nutritional choices, exercise, and elimination of habits contributing to risk.                  We agreed on a plan to reduce the current cardiovascular risk based on above discussion as needed.                     Aspirin use/disuse was discussed and documented in the Problem List of the medical record (under Cardiac Risk Counseling) after reviewing the updated guidelines below:                 Consider low dose Aspirin ( mg) use if the benefit for cardiovascular disease prevention outweighs risk for bleeding complications.                  In general, low dose ASA should be considered:                 In patients WITHOUT prior MI/stroke/PAD (primary prevention):                  a.          Age <60: Use if 10-year cardiovascular disease risk >20%, with discussion of risks and benefits with patient                 b.          Age 60-<70: Use if 10-year cardiovascular disease risk >20% and low bleeding (e.g., gastrointestinal) risk, with discussion of risks and benefits with patient                 c.          Age >=70: Do not use                    In patients  WITH prior MI/stroke/PAD (secondary prevention):                  Generally use unless extremely high bleeding (e.g., gastrointestinal) risk, with discussion of risks and benefits with patient      Please follow up in  1 yr for medicare wellness  or as needed.

## 2024-04-23 LAB
ALBUMIN SERPL BCP-MCNC: 4.2 G/DL (ref 3.4–5)
ANION GAP SERPL CALC-SCNC: 16 MMOL/L (ref 10–20)
APPEARANCE UR: CLEAR
BILIRUB UR STRIP.AUTO-MCNC: NEGATIVE MG/DL
BUN SERPL-MCNC: 15 MG/DL (ref 6–23)
CALCIUM SERPL-MCNC: 10 MG/DL (ref 8.6–10.6)
CHLORIDE SERPL-SCNC: 102 MMOL/L (ref 98–107)
CO2 SERPL-SCNC: 27 MMOL/L (ref 21–32)
COLOR UR: ABNORMAL
CREAT SERPL-MCNC: 1.15 MG/DL (ref 0.5–1.05)
EGFRCR SERPLBLD CKD-EPI 2021: 47 ML/MIN/1.73M*2
GLUCOSE SERPL-MCNC: 80 MG/DL (ref 74–99)
GLUCOSE UR STRIP.AUTO-MCNC: NORMAL MG/DL
KETONES UR STRIP.AUTO-MCNC: ABNORMAL MG/DL
LEUKOCYTE ESTERASE UR QL STRIP.AUTO: ABNORMAL
NITRITE UR QL STRIP.AUTO: NEGATIVE
PH UR STRIP.AUTO: 5.5 [PH]
PHOSPHATE SERPL-MCNC: 3.2 MG/DL (ref 2.5–4.9)
POTASSIUM SERPL-SCNC: 4.1 MMOL/L (ref 3.5–5.3)
PROT UR STRIP.AUTO-MCNC: NEGATIVE MG/DL
RBC # UR STRIP.AUTO: NEGATIVE /UL
RBC #/AREA URNS AUTO: ABNORMAL /HPF
SODIUM SERPL-SCNC: 141 MMOL/L (ref 136–145)
SP GR UR STRIP.AUTO: 1.02
UROBILINOGEN UR STRIP.AUTO-MCNC: NORMAL MG/DL
WBC #/AREA URNS AUTO: ABNORMAL /HPF

## 2024-04-24 ENCOUNTER — ALLIED HEALTH (OUTPATIENT)
Dept: INTEGRATIVE MEDICINE | Facility: CLINIC | Age: 84
End: 2024-04-24
Payer: MEDICARE

## 2024-04-24 DIAGNOSIS — M99.03 SEGMENTAL AND SOMATIC DYSFUNCTION OF LUMBAR REGION: Primary | ICD-10-CM

## 2024-04-24 DIAGNOSIS — M99.02 SEGMENTAL AND SOMATIC DYSFUNCTION OF THORACIC REGION: ICD-10-CM

## 2024-04-24 DIAGNOSIS — M99.01 SEGMENTAL DYSFUNCTION OF CERVICAL REGION: ICD-10-CM

## 2024-04-24 DIAGNOSIS — M53.3 SACROILIAC DYSFUNCTION: ICD-10-CM

## 2024-04-24 DIAGNOSIS — M79.9 POSTURAL STRAIN: ICD-10-CM

## 2024-04-24 LAB — BACTERIA UR CULT: NO GROWTH

## 2024-04-24 PROCEDURE — 98941 CHIROPRACT MANJ 3-4 REGIONS: CPT | Performed by: CHIROPRACTOR

## 2024-04-24 NOTE — PROGRESS NOTES
Subjective   Patient ID: Jovanna Bucio is a 83 y.o. female who presents April 24, 2024 for chiropractic care.    MCR    Today, the patient rates their degree of pain as a 1 out of 10 on the numeric pain rating scale.     HPI : Jovanna returns to my office for chiropractic care. She reports that she continues to feel well overall. She reports she exercises regularly through walking, stretching and trampoline exercises. She is participating in a balance exercise series class that she has found quite beneficial so far. Notes that chiropractic care has been helpful in maintaining an active lifestyle. Denies new trauma/incident.    Objective   Physical Exam  Neurological:      General: No focal deficit present.      Mental Status: She is alert and oriented to person, place, and time.      Cranial Nerves: No dysarthria or facial asymmetry.      Sensory: Sensation is intact.      Motor: Motor function is intact.      Coordination: Coordination is intact.      Gait: Gait is intact. Gait normal.         Palpation of the following region(s) revealed:  Cervical: Upper trapezius bilateral, muscular hypertonicity.  Cervical paraspinals bilateral, muscular hypertonicity.  Thoracic: Thoracic paraspinals bilateral, muscular hypertonicity.  Rhomboids bilateral, muscular hypertonicity.  Lumbar: Lumbar paraspinals right, muscular hypertonicity.  Quadratus lumborum right, muscular hypertonicity.  Gluteal bilateral, muscular hypertonicity.        Segmental Joint(s): Segmental joint dysfunction was assessed with motion palpation and is identified in the following areas:  Cervical : C2 C5  Thoracic : T3, T4, and T7  Lumbopelvic / Sacral SIJ : L5 and R SIJ      Assessment/Plan   Today's Treatment Included: Chiropractic manipulation to the Segmental Joint(s) Cervical : C2 C5 Segmental Joint(s) Lumbopelvic/Sacral SIJ : L5 and R SIJ Segmental Joint(s) Thoracic : T3, T4, and T7   Treatment Techniques Used : Activator/Tool assisted  technique and Pelvic drop table technique    Soft-tissue mobilization was performed in the following areas:   Cervical paraspinal mm. bilateral and Upper Trapezius bilateral  Middle Trapezius bilateral, Rhomboids bilateral, and Pectoralis bilateral  Lumbar Paraspinal mm. bilateral, Quadratus Lumborum bilateral, and Gluteal mm. Glute. Med. bilateral      F/U in 4-6 weeks    The patient tolerated today's treatment with little or no additional discomfort and was instructed to contact the office for questions or concerns.

## 2024-05-09 ENCOUNTER — EVALUATION (OUTPATIENT)
Dept: PHYSICAL THERAPY | Facility: CLINIC | Age: 84
End: 2024-05-09
Payer: MEDICARE

## 2024-05-09 DIAGNOSIS — Z91.81 AT RISK FOR FALLING: ICD-10-CM

## 2024-05-09 DIAGNOSIS — M62.81 MUSCLE WEAKNESS: ICD-10-CM

## 2024-05-09 DIAGNOSIS — R26.89 POOR BALANCE: Primary | ICD-10-CM

## 2024-05-09 PROCEDURE — 97110 THERAPEUTIC EXERCISES: CPT | Mod: GP | Performed by: PHYSICAL THERAPIST

## 2024-05-09 PROCEDURE — 97161 PT EVAL LOW COMPLEX 20 MIN: CPT | Mod: GP | Performed by: PHYSICAL THERAPIST

## 2024-05-09 ASSESSMENT — ENCOUNTER SYMPTOMS
LOSS OF SENSATION IN FEET: 0
OCCASIONAL FEELINGS OF UNSTEADINESS: 0
DEPRESSION: 0

## 2024-05-10 ENCOUNTER — TELEMEDICINE (OUTPATIENT)
Dept: PHARMACY | Facility: HOSPITAL | Age: 84
End: 2024-05-10
Payer: MEDICARE

## 2024-05-10 DIAGNOSIS — N18.31 CHRONIC KIDNEY DISEASE, STAGE 3A (MULTI): ICD-10-CM

## 2024-05-10 PROBLEM — M62.81 MUSCLE WEAKNESS: Status: ACTIVE | Noted: 2024-05-10

## 2024-05-10 NOTE — ASSESSMENT & PLAN NOTE
Discussed SGLT-2 inhibitor options Farxiga and Jardiance. Discussed benefit for renal and cardio protection. Patient declines at this time and would prefer to remain on her current supplements and retest in 2-3 months.     Discussed concerns with unregulated supplements including purity and efficacy.

## 2024-05-10 NOTE — PROGRESS NOTES
"Subjective   Patient ID: Jovanna Bucio is a 83 y.o. female who presents for Chronic Kidney Disease.    Referring Provider: Khloe Hawkins DO    Patient has started taking the following supplements for \"kidney health\":         Objective     There were no vitals taken for this visit.     CKD ASSESSMENT    Renal Function  CKD Stage: 3a  eGFR: 47 mL/min/1.73 m2 as of 4/22/24  sCr: 1.15 mg/dL as of 4/22/24    Hypertension  BP: 138/70  Per chart review of vitals:  Systolic Diastolic Date   132 70 4/17/23                    Medications:   None    Diabetes  A1C: Not available  Medications:  None    Hyperlipidemia  LDL: 90 as of 8/28/2023  On statin? No    Medications reviewed for appropriate dosing in setting of CKD  Recommended changes: None      LAB  Lab Results   Component Value Date    BILITOT 0.6 08/28/2023    CALCIUM 10.0 04/22/2024    CO2 27 04/22/2024     04/22/2024    CREATININE 1.15 (H) 04/22/2024    GLUCOSE 80 04/22/2024    ALKPHOS 60 08/28/2023    K 4.1 04/22/2024    PROT 7.2 08/28/2023     04/22/2024    AST 22 08/28/2023    ALT 12 08/28/2023    BUN 15 04/22/2024    ANIONGAP 16 04/22/2024    MG 2.10 11/06/2019    PHOS 3.2 04/22/2024    ALBUMIN 4.2 04/22/2024    GFRF 54 (A) 08/28/2023     Lab Results   Component Value Date    TRIG 58 08/28/2023    CHOL 177 08/28/2023    HDL 75.7 08/28/2023     No results found for: \"HGBA1C\"  The ASCVD Risk score (Traci DK, et al., 2019) failed to calculate for the following reasons:    The 2019 ASCVD risk score is only valid for ages 40 to 79    Assessment/Plan   Problem List Items Addressed This Visit       Chronic kidney disease, stage 3a (Multi)     Discussed SGLT-2 inhibitor options Farxiga and Jardiance. Discussed benefit for renal and cardio protection. Patient declines at this time and would prefer to remain on her current supplements and retest in 2-3 months.     Discussed concerns with unregulated supplements including purity and efficacy.       "       Follow-up: patient declines at this time. Plans to repeat labs in 2-3 months     Blank Escobedo PharmD [facility]  Clinical Pharmacist Specialist, Primary Care    Continue all meds under the continuation of care with the referring provider and clinical pharmacy team

## 2024-05-10 NOTE — PROGRESS NOTES
Physical Therapy    Physical Therapy Evaluation and Treatment      Patient Name: Jovanna Bucio  MRN: [id number]  Today's Date: 5/10/2024  Start Time: 615  Stop Time: 700  Total Time: 45 mins  PT Evaluation Time Entry  PT Evaluation (Low) Time Entry: 30  PT Therapeutic Procedures Time Entry  Therapeutic Exercise Time Entry: 12     Insurance: Medicare  Certification: 5/9/24 - 8/7/24  PT visit limit: Medical necessity  Visit #1    Assessment:  Jovanna is an active 83 y.o. female presenting with balance problems. Exam shows decreased lower extremity strength and mild balance and gait impairments. TUG and 5XSTS test results place pt in minimal to no risk for falls category. She would benefit from skilled PT to address the above impairments to reduce potential future fall risk.    Plan:  OP PT Plan  Treatment/Interventions: Education/ Instruction, Neuromuscular re-education, Self care/ home management, Therapeutic exercises, Therapeutic activities  PT Plan: Skilled PT  PT Frequency:  (1x per week to every other week)  Duration: 5-6 visits  Onset Date: 04/24/24  Certification Period Start Date: 05/09/24  Certification Period End Date: 08/07/24  Number of Treatments Authorized: Medical necessity  Rehab Potential: Excellent  Plan of Care Agreement: Patient    Current Problem:   1. Poor balance  Referral to Physical Therapy    Follow Up In Physical Therapy      2. Muscle weakness  Follow Up In Physical Therapy      3. At risk for falling  Referral to Physical Therapy    Follow Up In Physical Therapy        General:  Reason for Referral: Balance problems  Referred By: Dr. Khloe Hawkins  Preferred Learning Style: verbal, visual, written    Subjective    Jovanna presents with c/o balance problems ongoing for several years. She had a fall 3 years ago when she was walking, dropped her phone, bent over to pick it up and fell over. She has not had any falls since but is more aware of her balance. She recently saw her PCP for  "a wellness visit and asked for a referral to work on balance. She has been taking exercise classes one day a week to work on her balance and strength. She does not feel limited by her balance issues. She denies taking any medications that affect her balance. PMH: chronic kidney disease    Pt Goals: “I just want my balance to be the best it can be.”    Precautions:  Precautions  STEADI Fall Risk Score (The score of 4 or more indicates an increased risk of falling): 2    Pain:  No c/o pain    Home Living:  Lives alone, bed/bath on first floor, flight of stairs to second floor with rail support, \"I go up and down the stairs 10 times per day for exercise.”    Prior Level of Function:  Independent in all activities.    Objective   Gait: Ambulates with slower tee, equal stance time and step length, minimal arm swing/trunk rotation     Strength R/L  Hip:   Flexion: 4/4  Abduction 4/4  Extension 4-/4-     Knee:  Flexion 4+/4+  Extension 4+/4+     Ankle:  Dorsiflexion 4+/4+  Plantarflexion 4+/4+     Balance:  Romber seconds w/ mild body sways  Tandem R FWD 30 sec / L FWD 30 sec  SLS R 8 sec / L 7 sec      5XSTS: 13.31 seconds     TUG (3 trials):  1 9.08 sec  2 9.22 sec  3 9.43 sec  Av.24 sec    Treatments:  Therapeutic Exercise:   Bridges x10  Hooklying clams red band x10  SLR x10 ea leg  SAQ x10 ea leg    EDUCATION:  Issued/reviewed HEP to be performed 1x/day.    Goals:  Active       PT Problem       Increase B lower extremity MMT by at least 1/3 grade to improve dynamic stability with walking and climbing stairs.       Start:  05/10/24    Expected End:  24            Pt will maintain SLS for at least 10 seconds on firm surface without LOB to reduce potential fall risk.       Start:  05/10/24    Expected End:  24            Pt will amb at improved tee to reduce potential fall risk and functional decline.       Start:  05/10/24    Expected End:  24            Pt will demonstrate " independence with HEP for proper self-management at home.       Start:  05/10/24    Expected End:  07/08/24

## 2024-05-22 ENCOUNTER — ALLIED HEALTH (OUTPATIENT)
Dept: INTEGRATIVE MEDICINE | Facility: CLINIC | Age: 84
End: 2024-05-22
Payer: MEDICARE

## 2024-05-22 DIAGNOSIS — M99.02 SEGMENTAL AND SOMATIC DYSFUNCTION OF THORACIC REGION: ICD-10-CM

## 2024-05-22 DIAGNOSIS — M79.9 POSTURAL STRAIN: ICD-10-CM

## 2024-05-22 DIAGNOSIS — M99.03 SEGMENTAL AND SOMATIC DYSFUNCTION OF LUMBAR REGION: Primary | ICD-10-CM

## 2024-05-22 DIAGNOSIS — M53.3 SACROILIAC DYSFUNCTION: ICD-10-CM

## 2024-05-22 DIAGNOSIS — M99.01 SEGMENTAL DYSFUNCTION OF CERVICAL REGION: ICD-10-CM

## 2024-05-22 PROCEDURE — 98941 CHIROPRACT MANJ 3-4 REGIONS: CPT | Performed by: CHIROPRACTOR

## 2024-05-22 NOTE — PROGRESS NOTES
Subjective   Patient ID: Jovanna Bucio is a 83 y.o. female who presents May 22, 2024 for chiropractic care.    MCR    Today, the patient rates their degree of pain as a 1 out of 10 on the numeric pain rating scale.     HPI : Jovanna returns to my office for chiropractic care. States she is working with her PCP and with holistic options to help combat chronic kidney disease. She has remained very active through walking, use of trampoline, bodyweight exercises and balance exercises through physical therapy. She reports that she continues to feel well overall. She has found chiropractic care helpful in maintaining an active lifestyle. Denies new trauma/incident.    Objective   Physical Exam  Neurological:      General: No focal deficit present.      Mental Status: She is alert and oriented to person, place, and time.      Cranial Nerves: No dysarthria or facial asymmetry.      Sensory: Sensation is intact.      Motor: Motor function is intact.      Coordination: Coordination is intact.      Gait: Gait is intact. Gait normal.         Palpation of the following region(s) revealed:  Cervical: Upper trapezius right, muscular hypertonicity.  Cervical paraspinals bilateral, muscular hypertonicity.  Thoracic: Thoracic paraspinals right, muscular hypertonicity.  Rhomboids bilateral, muscular hypertonicity.  Lumbar: Lumbar paraspinals right, muscular hypertonicity.  Quadratus lumborum right, muscular hypertonicity.  Gluteal bilateral, muscular hypertonicity.        Segmental Joint(s): Segmental joint dysfunction was assessed with motion palpation and is identified in the following areas:  Cervical : C2 C5  Thoracic : T3, T4, and T7  Lumbopelvic / Sacral SIJ : L5 and R SIJ      Assessment/Plan   Today's Treatment Included: Chiropractic manipulation to the Segmental Joint(s) Cervical : C2 C5 Segmental Joint(s) Lumbopelvic/Sacral SIJ : L5 and R SIJ Segmental Joint(s) Thoracic : T3, T4, and T7   Treatment Techniques Used :  Activator/Tool assisted technique and Pelvic drop table technique    Soft-tissue mobilization was performed in the following areas:   Cervical paraspinal mm. bilateral and Upper Trapezius bilateral  Middle Trapezius bilateral, Rhomboids bilateral, and Pectoralis bilateral  Lumbar Paraspinal mm. bilateral, Quadratus Lumborum bilateral, and Gluteal mm. Glute. Med. bilateral      F/U in 4-6 weeks    The patient tolerated today's treatment with little or no additional discomfort and was instructed to contact the office for questions or concerns.

## 2024-06-03 ENCOUNTER — TREATMENT (OUTPATIENT)
Dept: PHYSICAL THERAPY | Facility: CLINIC | Age: 84
End: 2024-06-03
Payer: MEDICARE

## 2024-06-03 DIAGNOSIS — Z91.81 AT RISK FOR FALLING: ICD-10-CM

## 2024-06-03 DIAGNOSIS — M62.81 MUSCLE WEAKNESS: ICD-10-CM

## 2024-06-03 DIAGNOSIS — R26.89 POOR BALANCE: ICD-10-CM

## 2024-06-03 PROCEDURE — 97110 THERAPEUTIC EXERCISES: CPT | Mod: GP,CQ

## 2024-06-03 NOTE — PROGRESS NOTES
"  Physical Therapy    Physical Therapy Evaluation and Treatment      Patient Name: Jovanna Bucio  MRN: [id number]  Today's Date: 6/3/2024  Start Time: 615  Stop Time: 700  Total Time: 45 mins     PT Therapeutic Procedures Time Entry  Therapeutic Exercise Time Entry: 30     Insurance: Medicare  Certification: 5/9/24 - 8/7/24  PT visit limit: Medical necessity  Visit #2    Assessment:  Good demo of established ex's.  Pt was able to progress standing strengthening and initiate balance work.  Good corrections w/ VC's to engage core w/ balance  Plan:   Biode[employer]    Current Problem:   1. Poor balance  Follow Up In Physical Therapy      2. Muscle weakness  Follow Up In Physical Therapy      3. At risk for falling  Follow Up In Physical Therapy            Subjective    \"My balance is better, I take 10 sec to perform each ex, 10 times.\"        Objective   Gait: Ambulates with slower tee, equal stance time and step length, minimal arm swing/trunk rotation     Strength R/L  Hip:   Flexion: 4/4  Abduction 4/4  Extension 4-/4-     Knee:  Flexion 4+/4+  Extension 4+/4+     Ankle:  Dorsiflexion 4+/4+  Plantarflexion 4+/4+           Treatments:  Therapeutic Exercise:   Bridges 10x10\"  Hooklying clams red band x10  SLR 10x10\" ea leg  SAQ x10 ea leg  Sit to stand x 10  HR x 10  Tandem balance x 5 ea, 10\"  Dot Drills x 5 ea  Nu Step x 5 min    EDUCATION:  Issued/reviewed HEP to be performed 1x/day.    Goals:  Active       PT Problem       Increase B lower extremity MMT by at least 1/3 grade to improve dynamic stability with walking and climbing stairs.       Start:  05/10/24    Expected End:  07/08/24            Pt will maintain SLS for at least 10 seconds on firm surface without LOB to reduce potential fall risk.       Start:  05/10/24    Expected End:  07/08/24            Pt will amb at improved tee to reduce potential fall risk and functional decline.       Start:  05/10/24    Expected End:  07/08/24            Pt will " demonstrate independence with HEP for proper self-management at home.       Start:  05/10/24    Expected End:  07/08/24

## 2024-06-05 ENCOUNTER — OFFICE VISIT (OUTPATIENT)
Dept: OBSTETRICS AND GYNECOLOGY | Facility: CLINIC | Age: 84
End: 2024-06-05
Payer: MEDICARE

## 2024-06-05 VITALS
WEIGHT: 134.4 LBS | SYSTOLIC BLOOD PRESSURE: 145 MMHG | HEIGHT: 62 IN | BODY MASS INDEX: 24.73 KG/M2 | DIASTOLIC BLOOD PRESSURE: 89 MMHG | HEART RATE: 77 BPM

## 2024-06-05 DIAGNOSIS — N39.41 URGE INCONTINENCE OF URINE: Primary | ICD-10-CM

## 2024-06-05 LAB
BILIRUBIN, POC: NEGATIVE
BLOOD URINE, POC: POSITIVE
CLARITY, POC: CLEAR
COLOR, POC: YELLOW
GLUCOSE URINE, POC: NEGATIVE
KETONES, POC: NEGATIVE
LEUKOCYTE EST, POC: NORMAL
NITRITE, POC: NEGATIVE
PH, POC: 5.5
POC APPEARANCE OF BODY FLUID: CLEAR
SPECIFIC GRAVITY, POC: 1.01
URINE PROTEIN, POC: NEGATIVE
UROBILINOGEN, POC: 0.2

## 2024-06-05 PROCEDURE — 87086 URINE CULTURE/COLONY COUNT: CPT | Performed by: STUDENT IN AN ORGANIZED HEALTH CARE EDUCATION/TRAINING PROGRAM

## 2024-06-05 PROCEDURE — 99204 OFFICE O/P NEW MOD 45 MIN: CPT | Performed by: STUDENT IN AN ORGANIZED HEALTH CARE EDUCATION/TRAINING PROGRAM

## 2024-06-05 PROCEDURE — 1123F ACP DISCUSS/DSCN MKR DOCD: CPT | Performed by: STUDENT IN AN ORGANIZED HEALTH CARE EDUCATION/TRAINING PROGRAM

## 2024-06-05 PROCEDURE — 99214 OFFICE O/P EST MOD 30 MIN: CPT | Performed by: STUDENT IN AN ORGANIZED HEALTH CARE EDUCATION/TRAINING PROGRAM

## 2024-06-05 PROCEDURE — 1036F TOBACCO NON-USER: CPT | Performed by: STUDENT IN AN ORGANIZED HEALTH CARE EDUCATION/TRAINING PROGRAM

## 2024-06-05 PROCEDURE — 81002 URINALYSIS NONAUTO W/O SCOPE: CPT | Mod: 91 | Performed by: STUDENT IN AN ORGANIZED HEALTH CARE EDUCATION/TRAINING PROGRAM

## 2024-06-05 PROCEDURE — 1159F MED LIST DOCD IN RCRD: CPT | Performed by: STUDENT IN AN ORGANIZED HEALTH CARE EDUCATION/TRAINING PROGRAM

## 2024-06-05 PROCEDURE — 1126F AMNT PAIN NOTED NONE PRSNT: CPT | Performed by: STUDENT IN AN ORGANIZED HEALTH CARE EDUCATION/TRAINING PROGRAM

## 2024-06-05 PROCEDURE — 81001 URINALYSIS AUTO W/SCOPE: CPT | Performed by: STUDENT IN AN ORGANIZED HEALTH CARE EDUCATION/TRAINING PROGRAM

## 2024-06-05 ASSESSMENT — PAIN SCALES - GENERAL: PAINLEVEL: 0-NO PAIN

## 2024-06-05 NOTE — PROGRESS NOTES
Referred by: Dr. Hawkins    PCP  Khloe Hawkins DO         CHIEF COMPLAINT:  UUI         HISTORY OF PRESENT ILLNESS:  This is a  83 y.o. y.o. female who presents with UUI.    Frequency with urge, unable to make it to the bathroom. Sometimes jumping on trampoline triggers urge. Denies WILLY though.    The following were reviewed to gain additional history:  External notes: Dr. Hawkins note 4/22/24  Test results: 1.02 Cr 8/28/23  Lab Results   Component Value Date    URINECULTURE No growth 04/22/2024       Specifically, she describes the following pelvic floor symptoms:          Prolapse: No       - Splinting to urinate: Yes       - Splinting for bowel movement/stool trapping: No              Incontinence:  Yes             Urge              Urinary Symptoms:       - Frequency:  Yes             # Voids:         - Nocturia: Yes             # Voids:  3-4       - Urgency:  Yes       - Incomplete emptying:  Yes - sometimes does not complete and comes back        - Hesitancy:  No       - Pain with voiding:  No       - Excessive fluid intake: No               History:       - Recurrent UTI:  No       - Hematuria:  No       - Stones:  No       - Kidney Disease:  Yes - Stage IIIa CKD                   Bowel Symptoms:       - Regular: Yes, once to twice daily        - Diarrhea:No       - Constipation: No       - Fecal Incontinence:  No       - Flatus Incontinence:  Yes       - Fecal urgency:   No    Past medical and surgical hx reviewed - pertinent for Stage IIIa CKD  Hysterectomy in her 30s  Smoking history: remote history         Gyn History:  - Menopausal: Yes, hysterectomy in 30s            Postmenopausal bleeding: No  - HRT: Yes, estrogen   - Pap up to date: Yes - hyst in 30s   History of abnormal pap: No  - Sexually active:  No  Dyspareunia: No   Other issues: no   - Number of prior vaginal deliveries: 2    Number of prior operative deliveries: no    Prior OASI? No  - Number of prior c-sections: n/a     - Mammogram up to  date: Yes, about 5 years ago   - Colonoscopy up to date: Yes, several years ago     OB History    No obstetric history on file.               PHYSICAL EXAMINATION:  No LMP recorded.  There is no height or weight on file to calculate BMI.  There were no vitals taken for this visit.  General Appearance: well appearing  Neuro: Alert and oriented   HEENT: mucous membranes moist, neck supple  Resp: No respiratory distress, normal work of breathing  MSK: normal range of motion, gait appropriate    Pelvic:  Genitourinary: normal external genitalia, Bartholin's glands negative, Frankenmuth's glands negative  Urethra: normal meatus, non-tender, no periurethral mass  Vaginal mucosa  normal  Cervix surgically absent  Uterus surgically absent  Adnexae  negative nontender, no masses  Atrophy positive    CST negative    POP-Q (in supine position):  No significant prolapse    Rectal: no hemorrhoids, fissures or masses      IMPRESSION AND PLAN:  Jovanna Bucio is a 83 y.o. who presents with OAB, UUI    OAB  - discussed etiology of OAB and potential management options  - reviewed bladder health recommendations (fluid intake, avoiding bladder triggers)  - discussed treatment options: PFPT, medications, PTNS, intradetrusor botox, SNM  - interested in PFPT  - would like to avoid medication if possible  - PI sheet given on bladder health    All questions and concerns were answered and addressed.  The patient expressed understanding and agrees with the plan.     RTC 6 months    6/5/2024     Brooklyn Cast MD

## 2024-06-06 LAB
RBC #/AREA URNS AUTO: NORMAL /HPF
WBC #/AREA URNS AUTO: NORMAL /HPF

## 2024-06-07 LAB — BACTERIA UR CULT: NO GROWTH

## 2024-06-19 ENCOUNTER — APPOINTMENT (OUTPATIENT)
Dept: INTEGRATIVE MEDICINE | Facility: CLINIC | Age: 84
End: 2024-06-19
Payer: MEDICARE

## 2024-06-19 DIAGNOSIS — M99.02 SEGMENTAL AND SOMATIC DYSFUNCTION OF THORACIC REGION: ICD-10-CM

## 2024-06-19 DIAGNOSIS — M79.9 POSTURAL STRAIN: ICD-10-CM

## 2024-06-19 DIAGNOSIS — M99.01 SEGMENTAL DYSFUNCTION OF CERVICAL REGION: ICD-10-CM

## 2024-06-19 DIAGNOSIS — M53.3 SACROILIAC DYSFUNCTION: ICD-10-CM

## 2024-06-19 DIAGNOSIS — M99.03 SEGMENTAL AND SOMATIC DYSFUNCTION OF LUMBAR REGION: Primary | ICD-10-CM

## 2024-06-19 PROCEDURE — 98941 CHIROPRACT MANJ 3-4 REGIONS: CPT | Performed by: CHIROPRACTOR

## 2024-06-19 NOTE — PROGRESS NOTES
Subjective   Patient ID: Jovanna Bucio is a 83 y.o. female who presents June 19, 2024 for chiropractic care.    MCR    Today, the patient rates their degree of pain as a 1 out of 10 on the numeric pain rating scale.     HPI : Jovanna returns to my office for chiropractic care. She has continued to feel quite well overall. She remains active through use of her home trampoline, walking outdoors, working with PT on balance and weight lifting. She is planning a trip to [place] to work with a holistic doctor in the near future. She has found chiropractic care helpful in complementing her health journey. Denies new trauma/incident.    Objective   Physical Exam  Neurological:      General: No focal deficit present.      Mental Status: She is alert and oriented to person, place, and time.      Cranial Nerves: No dysarthria or facial asymmetry.      Sensory: Sensation is intact.      Motor: Motor function is intact.      Coordination: Coordination is intact.      Gait: Gait is intact. Gait normal.         Palpation of the following region(s) revealed:  Cervical: Upper trapezius right, muscular hypertonicity.  Cervical paraspinals bilateral, muscular hypertonicity.  Thoracic: Thoracic paraspinals right, muscular hypertonicity.  Rhomboids bilateral, muscular hypertonicity.  Lumbar: Lumbar paraspinals right, muscular hypertonicity.  Quadratus lumborum right, muscular hypertonicity.  Gluteal bilateral, muscular hypertonicity.        Segmental Joint(s): Segmental joint dysfunction was assessed with motion palpation and is identified in the following areas:  Cervical : C5 C7  Thoracic : T1, T3, T4, and T7  Lumbopelvic / Sacral SIJ : L5, R SIJ, and L SIJ      Assessment/Plan   Today's Treatment Included: Chiropractic manipulation to the Segmental Joint(s) Cervical : C5 C7 Segmental Joint(s) Lumbopelvic/Sacral SIJ : L5, R SIJ, and L SIJ Segmental Joint(s) Thoracic : T1, T3, T4, and T7   Treatment Techniques Used : Direct  Non-force technique, Activator/Tool assisted technique, and Low force    Soft-tissue mobilization was performed in the following areas:   Cervical paraspinal mm. bilateral and Upper Trapezius bilateral  Middle Trapezius bilateral, Rhomboids bilateral, and Pectoralis bilateral  Lumbar Paraspinal mm. bilateral, Quadratus Lumborum bilateral, and Gluteal mm. Glute. Med. bilateral      F/U in 4-6 weeks    The patient tolerated today's treatment with little or no additional discomfort and was instructed to contact the office for questions or concerns.

## 2024-06-20 ENCOUNTER — TREATMENT (OUTPATIENT)
Dept: PHYSICAL THERAPY | Facility: CLINIC | Age: 84
End: 2024-06-20
Payer: MEDICARE

## 2024-06-20 DIAGNOSIS — M62.81 MUSCLE WEAKNESS: ICD-10-CM

## 2024-06-20 DIAGNOSIS — Z91.81 AT RISK FOR FALLING: ICD-10-CM

## 2024-06-20 DIAGNOSIS — R26.89 POOR BALANCE: Primary | ICD-10-CM

## 2024-06-20 PROCEDURE — 97112 NEUROMUSCULAR REEDUCATION: CPT | Mod: GP | Performed by: PHYSICAL THERAPIST

## 2024-06-20 PROCEDURE — 97110 THERAPEUTIC EXERCISES: CPT | Mod: GP | Performed by: PHYSICAL THERAPIST

## 2024-06-20 NOTE — PROGRESS NOTES
"  Physical Therapy    Physical Therapy Evaluation and Treatment      Patient Name: Jovanna Bucio  MRN: [id number]  Today's Date: 6/20/2024  Start Time: 530  Stop Time: 615  Total Time: 45 mins     PT Therapeutic Procedures Time Entry  Neuromuscular Re-Education Time Entry: 10  Therapeutic Exercise Time Entry: 30     Insurance: Medicare  Certification: 5/9/24 - 8/7/24  PT visit limit: Medical necessity  Visit #3    Assessment:  Pt does very well with static tandem stance; maintains balance with min to no UE support. Needs UE support at rail during tandem walk. Dynamic balance challenged while stepping backwards during dot drill.    Plan:   [employer]    Current Problem:   1. Poor balance  Follow Up In Physical Therapy      2. Muscle weakness  Follow Up In Physical Therapy      3. At risk for falling  Follow Up In Physical Therapy          Subjective    \"The tandem balance is still challenging but I'm practicing it at home.\"    Objective   Minimal to no UE support at table during static tandem stance.    Treatments:  Therapeutic Exercise:   Bike x 5 mins  Bridges 10x10\"  Hooklying clams red band x10  SLR 10x10\" ea leg  SAQ 2# x10 ea leg  Sit to stand 2x10  Calf raises 2x10  4\" step up march 2x10 ea leg    Neuromuscular Re-ed:  Tandem balance x5 ea, 10\"  Tandem walking at rail x5 lengths  Dot drill stepping x 5 ea way    Goals:  Active       PT Problem       Increase B lower extremity MMT by at least 1/3 grade to improve dynamic stability with walking and climbing stairs.       Start:  05/10/24    Expected End:  07/08/24            Pt will maintain SLS for at least 10 seconds on firm surface without LOB to reduce potential fall risk.       Start:  05/10/24    Expected End:  07/08/24            Pt will amb at improved tee to reduce potential fall risk and functional decline.       Start:  05/10/24    Expected End:  07/08/24            Pt will demonstrate independence with HEP for proper self-management at home.  "      Start:  05/10/24    Expected End:  07/08/24

## 2024-07-08 ENCOUNTER — APPOINTMENT (OUTPATIENT)
Dept: PHYSICAL THERAPY | Facility: CLINIC | Age: 84
End: 2024-07-08
Payer: MEDICARE

## 2024-07-12 ENCOUNTER — TREATMENT (OUTPATIENT)
Dept: PHYSICAL THERAPY | Facility: CLINIC | Age: 84
End: 2024-07-12
Payer: MEDICARE

## 2024-07-12 DIAGNOSIS — R26.89 POOR BALANCE: Primary | ICD-10-CM

## 2024-07-12 DIAGNOSIS — M62.81 MUSCLE WEAKNESS: ICD-10-CM

## 2024-07-12 DIAGNOSIS — Z91.81 AT RISK FOR FALLING: ICD-10-CM

## 2024-07-12 PROCEDURE — 97112 NEUROMUSCULAR REEDUCATION: CPT | Mod: GP | Performed by: PHYSICAL THERAPIST

## 2024-07-12 PROCEDURE — 97110 THERAPEUTIC EXERCISES: CPT | Mod: GP | Performed by: PHYSICAL THERAPIST

## 2024-07-12 NOTE — PROGRESS NOTES
"  Physical Therapy    Physical Therapy Evaluation and Treatment      Patient Name: Jovanna Bucio  MRN: [id number]  Today's Date: 2024  Start Time: 900  Stop Time: 950  Total Time: 50 mins     PT Therapeutic Procedures Time Entry  Neuromuscular Re-Education Time Entry: 15  Therapeutic Exercise Time Entry: 25     Insurance: Medicare  Certification: 24 - 24  PT visit limit: Medical necessity  Visit #4    Assessment:  Reassess shows improved gait, improved lower extremity strength with MMT and improved single leg balance. Functional testing shows 5XSTS improved by 3 seconds and TUG improved by ~30 seconds from initial visit. Jovanna has met all PT goals and is ready to continue with her home exercises independently.    Plan:  Discharge to independent [facility].    Problem List Items Addressed This Visit             ICD-10-CM    Muscle weakness M62.81     Other Visit Diagnoses         Codes    Poor balance    -  Primary R26.89    At risk for falling     Z91.81            Subjective    \"I feel more assurance with my balance. I'm picking my feet up more when I'm walking. I'm still working on the tandem walking. I do my exercises 3x/week.\"    Objective   Gait: equal stance time and step length, improved tee and arm swing/trunk rotation     Strength R/L  Hip:   Flexion: 4+/4+  Abduction 4+/4+  Extension 4/4     Knee:  Flexion 4+/4+  Extension 5/5     Ankle:  Dorsiflexion 5/5  Plantarflexion 5/5     Balance:  Romber seconds w/ mild body sways  Tandem R FWD 30 sec / L FWD 30 sec  SLS R 10 sec / L 10 sec      5XSTS: 10.28 seconds     TUG (3 trials):  1 8.89 sec  2 8.99 sec  3 8.84 sec  Av.91 sec    Treatments:  Therapeutic Exercise:   Bike x 5 mins  Bridges 10x10\"  Hooklying clams red band x10  SLR 10x10\" ea leg  SAQ 2# x10 ea leg  Sit to stand 2x10  Calf raises 2x10  6\" step up march 2x10 ea leg    Neuromuscular Re-ed:  Tandem balance x5 ea, 10\"  Floor SLS x3, 10\"  Tandem walking at rail x6 " lengths  Dot drill stepping x 5 ea way    Goals:  Active       PT Problem       Increase B lower extremity MMT by at least 1/3 grade to improve dynamic stability with walking and climbing stairs.       Start:  05/10/24    Expected End:  07/08/24            Pt will maintain SLS for at least 10 seconds on firm surface without LOB to reduce potential fall risk.       Start:  05/10/24    Expected End:  07/08/24            Pt will amb at improved tee to reduce potential fall risk and functional decline.       Start:  05/10/24    Expected End:  07/08/24            Pt will demonstrate independence with HEP for proper self-management at home.       Start:  05/10/24    Expected End:  07/08/24

## 2024-07-17 ENCOUNTER — APPOINTMENT (OUTPATIENT)
Dept: INTEGRATIVE MEDICINE | Facility: CLINIC | Age: 84
End: 2024-07-17
Payer: MEDICARE

## 2024-07-17 DIAGNOSIS — M99.03 SEGMENTAL AND SOMATIC DYSFUNCTION OF LUMBAR REGION: Primary | ICD-10-CM

## 2024-07-17 DIAGNOSIS — M99.01 SEGMENTAL DYSFUNCTION OF CERVICAL REGION: ICD-10-CM

## 2024-07-17 DIAGNOSIS — M79.9 POSTURAL STRAIN: ICD-10-CM

## 2024-07-17 DIAGNOSIS — M99.02 SEGMENTAL AND SOMATIC DYSFUNCTION OF THORACIC REGION: ICD-10-CM

## 2024-07-17 DIAGNOSIS — M53.3 SACROILIAC DYSFUNCTION: ICD-10-CM

## 2024-07-17 PROCEDURE — 98941 CHIROPRACT MANJ 3-4 REGIONS: CPT | Performed by: CHIROPRACTOR

## 2024-07-17 NOTE — PROGRESS NOTES
Subjective   Patient ID: Jovanna Bucio is a 84 y.o. female who presents July 17, 2024 for chiropractic care.    MCR    Today, the patient rates their degree of pain as a 1 out of 10 on the numeric pain rating scale.     HPI : Jovanna returns to my office for chiropractic care. She attended a  Wellness event today in [place] and states she took two hours of yoga. One chair class and one mat. States that she was surprised she was able to do all the movements without issue. She even noticed she was able to hold a tree pose with improved balance. Reports she did shift between which leg was crossed at times to avoid hip and lower back strain. Otherwise, was able to complete without issue. She continues to stay very active and follows a plant-based diet. Denies other changes to health.    Objective   Physical Exam  Neurological:      General: No focal deficit present.      Mental Status: She is alert and oriented to person, place, and time.      Cranial Nerves: No dysarthria or facial asymmetry.      Sensory: Sensation is intact.      Motor: Motor function is intact.      Coordination: Coordination is intact.      Gait: Gait is intact. Gait normal.         Palpation of the following region(s) revealed:  Cervical: Upper trapezius right, muscular hypertonicity.  Cervical paraspinals bilateral, muscular hypertonicity.  Thoracic: Thoracic paraspinals right, muscular hypertonicity.  Rhomboids bilateral, muscular hypertonicity.  Lumbar: Lumbar paraspinals right, muscular hypertonicity.  Quadratus lumborum right, muscular hypertonicity.  Gluteal bilateral, muscular hypertonicity.        Segmental Joint(s): Segmental joint dysfunction was assessed with motion palpation and is identified in the following areas:  Cervical : C7  Thoracic : T1, T3, T4, and T7  Lumbopelvic / Sacral SIJ : L5 and R SIJ      Assessment/Plan   Today's Treatment Included: Chiropractic manipulation to the Segmental Joint(s) Cervical : C7  Segmental Joint(s) Lumbopelvic/Sacral SIJ : L5 and R SIJ Segmental Joint(s) Thoracic : T1, T3, T4, and T7   Treatment Techniques Used : Direct Non-force technique, Activator/Tool assisted technique, and Low force    Soft-tissue mobilization was performed in the following areas:   Cervical paraspinal mm. bilateral and Upper Trapezius bilateral  Middle Trapezius bilateral, Rhomboids bilateral, and Pectoralis bilateral  Lumbar Paraspinal mm. bilateral, Quadratus Lumborum bilateral, and Gluteal mm. Glute. Med. bilateral      F/U in 4-6 weeks    The patient tolerated today's treatment with little or no additional discomfort and was instructed to contact the office for questions or concerns.

## 2024-07-22 ENCOUNTER — EVALUATION (OUTPATIENT)
Dept: PHYSICAL THERAPY | Facility: CLINIC | Age: 84
End: 2024-07-22
Payer: MEDICARE

## 2024-07-22 DIAGNOSIS — N39.41 URGE INCONTINENCE OF URINE: ICD-10-CM

## 2024-07-22 PROCEDURE — 97161 PT EVAL LOW COMPLEX 20 MIN: CPT | Mod: GP | Performed by: PHYSICAL THERAPIST

## 2024-07-22 PROCEDURE — 97535 SELF CARE MNGMENT TRAINING: CPT | Mod: GP | Performed by: PHYSICAL THERAPIST

## 2024-07-22 NOTE — PROGRESS NOTES
Physical Therapy    PELVIC FLOOR EVALUATION AND TREATMENT    Name: Jovanna Bucio  MRN: [id number]  : 1940  Today's Date: 24     Time Calculation  Start Time: 1410  Stop Time: 1500  Time Calculation (min): 50 min  Visit: 1  Insurance: Medicare  Auth: No   Cert dates: 24-10/20/24    Assessment:  Pt presents to clinic with chief complaint of urinary urgency, frequency, and urge UI. Pt reports symptoms have remained largely unchanged over the last 5 years. Spent majority of today's session educating pt on bladder habits, urgency control techniques, and bladder diary to begin at home  Pt will benefit from skilled therapy to address current impairments and bladder retraining for improved urgency and frequency         Plan:   Treatment/Interventions: Cryotherapy, Education/ Instruction, Gait training, Hot pack, Manual therapy, Neuromuscular re-education, Self care/ home management, Therapeutic activities, Therapeutic exercises  PT Plan: Skilled PT  PT Frequency: 1 time per week  Duration: 4 weeks  Onset Date: 24  Certification Period Start Date: 24  Certification Period End Date: 10/20/24  Number of Treatments Authorized: MN  Rehab Potential: Good  Plan of Care Agreement: Patient      Current Problem:  Problem List Items Addressed This Visit             ICD-10-CM    Urge incontinence of urine N39.41    Relevant Orders    Follow Up In Physical Therapy         Subjective   General  Reason for Referral: Urge UI  Referred By: Dr. Brooklyn Cast  Preferred Learning Style: visual, written, verbal  Precautions  STEADI Fall Risk Score (The score of 4 or more indicates an increased risk of falling): 3  Precautions Comment: None         Objective   PELVIC HISTORY:    Chief Complaint/Description of Symptoms:   Pt presents to clinic with chief complaint of urinary urgency and frequency as well as urge UI and nocturia       HPI:   Pt reports urinary urgency and frequency as well as urge UI  Pt states  when she has strong urgency, she has a hard time controlling her bladder   She is getting up every 2 hrs during the night     Symptoms have been ongoing for at least the last 5 years  When she goes out, she does feel like she has to know where the bathrooms are located       Home Environment/Social Factors/Occupation:   No pelvic or abdominal surgeries at all   2 previous pregnancies   Vaginal births with both       PELVIC PAIN:     No pain per pt    Since onset, the symptoms are: unchanged  Pain when emptying bladder: No  Pain with wiping or tight clothing: No  Pain with intercourse: Pt not sexually active at this time   History of back pain: very seldom     EXERCISE:  Do you do Kegels? No   Current exercise regime:     BLADDER:     Excessive Urinary Urgency: Sometimes  Daytime Voiding Frequency: every 1-2 hrs  Nighttime Voiding Frequency: getting up at least every 2 hrs during the night   Unintentional urine loss frequency: couple of times per week   Leakage occurs with: urgency    Leakage amount: small to moderate amount   Difficulty initiating flow of urine: No   Slow/weak urine stream: sometimes  Difficulty starting urine stream/push to urinate: No   Able to completely empty bladder: No  Tests performed by doctor: Internal per Dr. Cast  Frequent UTI's: No     BOWEL:     Excessive Bowel Urgency: No  BM Frequency: 1-2x/day   Frequent Diarrhea: No  Frequent constipation/straining/incomplete emptying: No  [place] Stool Scale rating: Type 4       POSTURE:   Mild rounded shoulder posturing  Decreased lumbar lordosis in standing    Wide base of support    GAIT:  Decreased tee with ambulation   Wide base of support with ambulation        OUTCOMES MEASURE:  Female NIH Chronic Prostatitis Symptom index: 13    Education:  PFPT  Assessment of PF  Role of PFPT   Potential cause of current symptoms    TREATMENT  Self-care:  Review of elimination position  Review and instruction on bladder diary, bladder  irritants  Review and instruction on urgency control techniques to perform to reduce frequency urgency symptoms     Careplan Goals:  Problem: PT Problem       Goal: Pt will decrease nocturia to 2x/night for improved sleep pattern          Goal: Pt will be independent in urgency control techniques for improved bladder control and bladder habits          Goal: Pt will report reduced urinary leakage to less than 2x/week for improved bladder control           Goal: Pt will be independent in HEP for home maintenance            Venkat Templeton, PT

## 2024-07-23 ASSESSMENT — ENCOUNTER SYMPTOMS
OCCASIONAL FEELINGS OF UNSTEADINESS: 1
LOSS OF SENSATION IN FEET: 0
DEPRESSION: 0

## 2024-08-08 ENCOUNTER — TREATMENT (OUTPATIENT)
Dept: PHYSICAL THERAPY | Facility: CLINIC | Age: 84
End: 2024-08-08
Payer: MEDICARE

## 2024-08-08 DIAGNOSIS — N39.41 URGE INCONTINENCE OF URINE: Primary | ICD-10-CM

## 2024-08-08 PROCEDURE — 97140 MANUAL THERAPY 1/> REGIONS: CPT | Mod: GP | Performed by: PHYSICAL THERAPIST

## 2024-08-08 PROCEDURE — 97110 THERAPEUTIC EXERCISES: CPT | Mod: GP | Performed by: PHYSICAL THERAPIST

## 2024-08-08 PROCEDURE — 97535 SELF CARE MNGMENT TRAINING: CPT | Mod: GP | Performed by: PHYSICAL THERAPIST

## 2024-08-08 NOTE — PROGRESS NOTES
"Physical Therapy    PELVIC FLOOR TREATMENT    Name: Jovanna Bucio  MRN: [id number]  : 1940  Today's Date: 24     Time Calculation  Start Time: 805  Stop Time: 900  Time Calculation (min): 55 min       PT Therapeutic Procedures Time Entry  Manual Therapy Time Entry: 30  Therapeutic Exercise Time Entry: 15  Self-Care/Home Mgmt Training: 15       Visit: 2  Insurance: Medicare  Auth: No   Cert dates: 24-10/20/24    Assessment:  Pt doing well with bladder retraining. Has reduced nocturia and has reduced urgency. She does demonstrate mild tension with internal examination and mild decrease in pelvic floor strength  Will continue to progress         Plan:   Return in 3 weeks  Assess lumbar spine, hips       Current Problem:  Problem List Items Addressed This Visit             ICD-10-CM    Urge incontinence of urine - Primary N39.41           Subjective   \"I'm training myself and training my bladder\"     Pt still having urgency with associated leakage; frequency has decreased and nocturia has decreased        Objective   POSTURE:   Mild rounded shoulder posturing  Decreased lumbar lordosis in standing    Wide base of support    GAIT:  Decreased tee with ambulation   Wide base of support with ambulation        EXTERNAL OBSERVATION:  Voluntary Contraction: Present   Voluntary Relaxation: Present, incomplete  Involuntary Contraction: Present  Involuntary Relaxation Incomplete         INTERNAL VAGINAL EXAMINATION:  PFM Strength: 3/5  Coordination: NT  Endurance: NT     INTERNAL PALPATION:   Mild tension noted with palpation at introitus and bilat bulbocavernosus, ischiocavernosus   No pain with palpation per pt      EXTERNAL PALPATION:  Levator Ani: No Pain/Tightness R, no Pain/Tightness L  Bulbo: No Pain/Tightness R, No Pain/Tightness L  Ischiocavernosus: No Pain/Tightness R, NO Pain/Tightness L  Transverse perineum: NO Pain/Tightness R, NO Pain/Tightness L    Vaginal atrophy noted  Internal " limited d/t tissue integrity       TREATMENT  Manual therapy:  Internal examination performed with informed pt consent     Self-care:  Review of urgency control and bladder control techniques  Review of bladder diary   Instruction on Kegel contraction with increased urgency to aid in reduced leakage     Therapeutic exercise:  Figure 4 stretch 2 x 1 min hold ea  Happy baby stretch x 1 min hold           Careplan Goals:  Problem: PT Problem       Goal: Pt will decrease nocturia to 2x/night for improved sleep pattern          Goal: Pt will be independent in urgency control techniques for improved bladder control and bladder habits          Goal: Pt will report reduced urinary leakage to less than 2x/week for improved bladder control           Goal: Pt will be independent in HEP for home maintenance            Venkat Templeton, PT

## 2024-08-15 ENCOUNTER — DOCUMENTATION (OUTPATIENT)
Dept: PHYSICAL THERAPY | Facility: CLINIC | Age: 84
End: 2024-08-15
Payer: MEDICARE

## 2024-08-15 ENCOUNTER — TREATMENT (OUTPATIENT)
Dept: PHYSICAL THERAPY | Facility: CLINIC | Age: 84
End: 2024-08-15
Payer: MEDICARE

## 2024-08-15 DIAGNOSIS — N39.41 URGE INCONTINENCE OF URINE: ICD-10-CM

## 2024-08-15 NOTE — PROGRESS NOTES
Physical Therapy                 Therapy Communication Note    Patient Name: Jovanna Bucio  MRN: [id number]  Today's Date: 8/15/2024     Discipline: Physical Therapy    Missed Visit Reason:      Missed Time: No Show    Comment: No show treatment today

## 2024-08-21 ENCOUNTER — APPOINTMENT (OUTPATIENT)
Dept: INTEGRATIVE MEDICINE | Facility: CLINIC | Age: 84
End: 2024-08-21
Payer: MEDICARE

## 2024-08-21 DIAGNOSIS — M99.03 SEGMENTAL AND SOMATIC DYSFUNCTION OF LUMBAR REGION: Primary | ICD-10-CM

## 2024-08-21 DIAGNOSIS — M99.01 SEGMENTAL DYSFUNCTION OF CERVICAL REGION: ICD-10-CM

## 2024-08-21 DIAGNOSIS — M53.3 SACROILIAC DYSFUNCTION: ICD-10-CM

## 2024-08-21 DIAGNOSIS — M99.02 SEGMENTAL AND SOMATIC DYSFUNCTION OF THORACIC REGION: ICD-10-CM

## 2024-08-21 DIAGNOSIS — M79.9 POSTURAL STRAIN: ICD-10-CM

## 2024-08-21 PROCEDURE — 98941 CHIROPRACT MANJ 3-4 REGIONS: CPT | Performed by: CHIROPRACTOR

## 2024-08-21 NOTE — PROGRESS NOTES
Subjective   Patient ID: Jovanna Bucio is a 84 y.o. female who presents August 21, 2024 for chiropractic care.    MCR    Today, the patient rates their degree of pain as a 1 out of 10 on the numeric pain rating scale.     HPI : Jovanna returns to my office for chiropractic care. She just returned home from a wellness retreat in [place] that she enjoyed. She reports receiving chiropractic care, reflexology, detoxes, etc.. Notes she has continued to stay very active through walking 11,000 steps daily and use of her trampoline at home. She continues to work on balance through physical therapy. Denies other changes to health.    Objective   Physical Exam  Neurological:      General: No focal deficit present.      Mental Status: She is alert and oriented to person, place, and time.      Cranial Nerves: No dysarthria or facial asymmetry.      Sensory: Sensation is intact.      Motor: Motor function is intact.      Coordination: Coordination is intact.      Gait: Gait is intact. Gait normal.         Palpation of the following region(s) revealed:  Cervical: Upper trapezius right, muscular hypertonicity.  Cervical paraspinals bilateral, muscular hypertonicity.  Thoracic: Thoracic paraspinals right, muscular hypertonicity.  Rhomboids bilateral, muscular hypertonicity.  Lumbar: Lumbar paraspinals right, muscular hypertonicity.  Quadratus lumborum right, muscular hypertonicity.  Gluteal bilateral, muscular hypertonicity.        Segmental Joint(s): Segmental joint dysfunction was assessed with motion palpation and is identified in the following areas:  Cervical : C5  Thoracic : T3, T4, T7, and T9  Lumbopelvic / Sacral SIJ : L5 and R SIJ      Assessment/Plan   Today's Treatment Included: Chiropractic manipulation to the Segmental Joint(s) Cervical : C5 Segmental Joint(s) Lumbopelvic/Sacral SIJ : L5 and R SIJ Segmental Joint(s) Thoracic : T3, T4, T7, and T9   Treatment Techniques Used : Direct Non-force technique,  Activator/Tool assisted technique, and Low force    Soft-tissue mobilization was performed in the following areas:   Cervical paraspinal mm. bilateral and Upper Trapezius bilateral  Middle Trapezius bilateral, Rhomboids bilateral, and Pectoralis bilateral  Lumbar Paraspinal mm. bilateral, Quadratus Lumborum bilateral, and Gluteal mm. Glute. Med. bilateral      F/U in 4-6 weeks    The patient tolerated today's treatment with little or no additional discomfort and was instructed to contact the office for questions or concerns.

## 2024-08-28 ENCOUNTER — TREATMENT (OUTPATIENT)
Dept: PHYSICAL THERAPY | Facility: CLINIC | Age: 84
End: 2024-08-28
Payer: MEDICARE

## 2024-08-28 DIAGNOSIS — N39.41 URGE INCONTINENCE OF URINE: ICD-10-CM

## 2024-08-28 PROCEDURE — 97110 THERAPEUTIC EXERCISES: CPT | Mod: GP | Performed by: PHYSICAL THERAPIST

## 2024-08-28 NOTE — PROGRESS NOTES
Physical Therapy    PELVIC FLOOR TREATMENT    Name: Jovanna Bucio  MRN: [id number]  : 1940  Today's Date: 24     Time Calculation  Start Time: 1455  Stop Time: 1535  Time Calculation (min): 40 min       PT Therapeutic Procedures Time Entry  Therapeutic Exercise Time Entry: 38       Visit: 3  Insurance: Medicare  Auth: No   Cert dates: 24-10/20/24    Assessment:  Overall, pt reports doing well with urgency control and reduced urinary symptoms at this point. Addressed mild hip immobility and weakness today with additional HEP work         Plan:   Address continued POC      Current Problem:  Problem List Items Addressed This Visit             ICD-10-CM    Urge incontinence of urine N39.41           Subjective   Pt reports she has still been working on urgency control techniques; sometimes she does feel she waits too long   She is also trying to drink her liquids more slowly and not finish them all in one short period of time        Objective     EXTERNAL OBSERVATION:  Voluntary Contraction: Present   Voluntary Relaxation: Present, incomplete  Involuntary Contraction: Present  Involuntary Relaxation Incomplete         INTERNAL VAGINAL EXAMINATION:  PFM Strength: 3/5  Coordination: NT  Endurance: NT     INTERNAL PALPATION:   Mild tension noted with palpation at introitus and bilat bulbocavernosus, ischiocavernosus   No pain with palpation per pt      EXTERNAL PALPATION:  Levator Ani: No Pain/Tightness R, no Pain/Tightness L  Bulbo: No Pain/Tightness R, No Pain/Tightness L  Ischiocavernosus: No Pain/Tightness R, NO Pain/Tightness L  Transverse perineum: NO Pain/Tightness R, NO Pain/Tightness L    Vaginal atrophy noted  Internal limited d/t tissue integrity     STRENGTH  R/L     Hip flexion: 4+/4+   Hip abd: 5/5   Hip add: 5/5   Hip IR: 4-/4-   Hip ER: 4+/4+     Quadriceps: 5/5    Hamstrings: 4+/4+     Ankles: WNL bilat     FLEXIBILITY  R/L   Hip flexion: WNL bilat   Hip ER: mild limitation / mild  limitation   Hip IR: mild limitation / mild limitation     No pain with PROM       TREATMENT  Therapeutic exercise:  Happy baby stretch x 1 min hold   Figure 4 stretch 2 x 1 min hold ea  Butterfly stretch x 1 min hold   SKTC stretch x 10 ea with 5 hold  Reverse clamshells x 10 ea   Clamshells x 10 ea   Squats at rail x 10         Careplan Goals:  Problem: PT Problem       Goal: Pt will decrease nocturia to 2x/night for improved sleep pattern          Goal: Pt will be independent in urgency control techniques for improved bladder control and bladder habits          Goal: Pt will report reduced urinary leakage to less than 2x/week for improved bladder control           Goal: Pt will be independent in HEP for home maintenance            Venkat Templeton, PT

## 2024-09-18 ENCOUNTER — LAB (OUTPATIENT)
Dept: LAB | Facility: LAB | Age: 84
End: 2024-09-18
Payer: MEDICARE

## 2024-09-18 ENCOUNTER — APPOINTMENT (OUTPATIENT)
Dept: INTEGRATIVE MEDICINE | Facility: CLINIC | Age: 84
End: 2024-09-18
Payer: MEDICARE

## 2024-09-18 ENCOUNTER — TREATMENT (OUTPATIENT)
Dept: PHYSICAL THERAPY | Facility: CLINIC | Age: 84
End: 2024-09-18
Payer: MEDICARE

## 2024-09-18 DIAGNOSIS — N39.41 URGE INCONTINENCE OF URINE: ICD-10-CM

## 2024-09-18 DIAGNOSIS — M99.03 SEGMENTAL AND SOMATIC DYSFUNCTION OF LUMBAR REGION: Primary | ICD-10-CM

## 2024-09-18 DIAGNOSIS — N18.31 CHRONIC KIDNEY DISEASE, STAGE 3A (MULTI): ICD-10-CM

## 2024-09-18 DIAGNOSIS — M99.02 SEGMENTAL AND SOMATIC DYSFUNCTION OF THORACIC REGION: ICD-10-CM

## 2024-09-18 DIAGNOSIS — M79.9 POSTURAL STRAIN: ICD-10-CM

## 2024-09-18 DIAGNOSIS — M99.01 SEGMENTAL DYSFUNCTION OF CERVICAL REGION: ICD-10-CM

## 2024-09-18 DIAGNOSIS — M53.3 SACROILIAC DYSFUNCTION: ICD-10-CM

## 2024-09-18 LAB
ALBUMIN SERPL BCP-MCNC: 4.1 G/DL (ref 3.4–5)
ALP SERPL-CCNC: 54 U/L (ref 33–136)
ALT SERPL W P-5'-P-CCNC: 13 U/L (ref 7–45)
ANION GAP SERPL CALC-SCNC: 14 MMOL/L (ref 10–20)
AST SERPL W P-5'-P-CCNC: 17 U/L (ref 9–39)
BILIRUB SERPL-MCNC: 0.6 MG/DL (ref 0–1.2)
BUN SERPL-MCNC: 15 MG/DL (ref 6–23)
CALCIUM SERPL-MCNC: 9.7 MG/DL (ref 8.6–10.6)
CHLORIDE SERPL-SCNC: 106 MMOL/L (ref 98–107)
CO2 SERPL-SCNC: 28 MMOL/L (ref 21–32)
CREAT SERPL-MCNC: 1.03 MG/DL (ref 0.5–1.05)
CREAT UR-MCNC: 103 MG/DL (ref 20–320)
EGFRCR SERPLBLD CKD-EPI 2021: 53 ML/MIN/1.73M*2
GLUCOSE SERPL-MCNC: 105 MG/DL (ref 74–99)
MICROALBUMIN UR-MCNC: <7 MG/L
MICROALBUMIN/CREAT UR: NORMAL MG/G{CREAT}
POTASSIUM SERPL-SCNC: 4.6 MMOL/L (ref 3.5–5.3)
PROT SERPL-MCNC: 7 G/DL (ref 6.4–8.2)
SODIUM SERPL-SCNC: 143 MMOL/L (ref 136–145)

## 2024-09-18 PROCEDURE — 80053 COMPREHEN METABOLIC PANEL: CPT

## 2024-09-18 PROCEDURE — 97535 SELF CARE MNGMENT TRAINING: CPT | Mod: GP | Performed by: PHYSICAL THERAPIST

## 2024-09-18 PROCEDURE — 98941 CHIROPRACT MANJ 3-4 REGIONS: CPT | Performed by: CHIROPRACTOR

## 2024-09-18 PROCEDURE — 82570 ASSAY OF URINE CREATININE: CPT

## 2024-09-18 PROCEDURE — 36415 COLL VENOUS BLD VENIPUNCTURE: CPT

## 2024-09-18 PROCEDURE — 82043 UR ALBUMIN QUANTITATIVE: CPT

## 2024-09-18 NOTE — PROGRESS NOTES
Physical Therapy    PELVIC FLOOR TREATMENT and DISCHARGE    Name: Jovanna Bucio  MRN: [id number]  : 1940  Today's Date: 24     Time Calculation  Start Time: 1200  Stop Time: 1220  Time Calculation (min): 20 min       PT Therapeutic Procedures Time Entry  Self-Care/Home Mgmt Training: 10       Visit: 4  Insurance: Medicare  Auth: No   Cert dates: 24-10/20/24    Assessment:  Pt has met or is progressing with all goals set at initial evaluation. At this time, pt will be discharged to [facility] and will no longer require skilled therapy  Instructed pt to call with additional concerns       Plan:   Discharge to [facility]  Instructed pt to call or email to follow up with additional questions/concerns       Current Problem:  Problem List Items Addressed This Visit             ICD-10-CM    Urge incontinence of urine N39.41         Subjective   Pt reports she is feeling good overall  Feels like she has good things to work on and has continued to work on bladder habits to help with reduced urgency          Objective     EXTERNAL OBSERVATION:  Voluntary Contraction: Present   Voluntary Relaxation: Present, incomplete  Involuntary Contraction: Present  Involuntary Relaxation Incomplete         INTERNAL VAGINAL EXAMINATION:  PFM Strength: 3/5  Coordination: NT  Endurance: NT     INTERNAL PALPATION:   Mild tension noted with palpation at introitus and bilat bulbocavernosus, ischiocavernosus   No pain with palpation per pt      EXTERNAL PALPATION:  Levator Ani: No Pain/Tightness R, no Pain/Tightness L  Bulbo: No Pain/Tightness R, No Pain/Tightness L  Ischiocavernosus: No Pain/Tightness R, NO Pain/Tightness L  Transverse perineum: NO Pain/Tightness R, NO Pain/Tightness L    Vaginal atrophy noted  Internal limited d/t tissue integrity     STRENGTH  R/L     Hip flexion: 4+/4+   Hip abd: 5/5   Hip add: 5/5   Hip IR: 4-/4-   Hip ER: 4+/4+     Quadriceps: 5/5    Hamstrings: 4+/4+     Ankles: WNL bilat      FLEXIBILITY  R/L   Hip flexion: WNL bilat   Hip ER: mild limitation / mild limitation   Hip IR: mild limitation / mild limitation     No pain with PROM       TREATMENT  Self-care:  Review and instruction in urgency control techniques  Reviewed with pt it is okay to work on Kegels every once in a while to see if she is able to stop flow but should not do this regularly; pelvic floor should be relaxed as she voids      Review:   Therapeutic exercise:  Happy baby stretch x 1 min hold   Figure 4 stretch 2 x 1 min hold ea  Butterfly stretch x 1 min hold   SKTC stretch x 10 ea with 5 hold  Reverse clamshells x 10 ea   Clamshells x 10 ea   Squats at rail x 10         Careplan Goals:  Problem: PT Problem       Goal: Pt will be independent in urgency control techniques for improved bladder control and bladder habits          Goal: Pt will report reduced urinary leakage to less than 2x/week for improved bladder control            Venkat Templeton, PT

## 2024-09-18 NOTE — PROGRESS NOTES
"Subjective   Patient ID: Jovanna Bucio is a 84 y.o. female who presents September 18, 2024 for chiropractic care.    MCR    Today, the patient rates their degree of pain as a 1 out of 10 on the numeric pain rating scale.     HPI : Jovanna returns to my office for chiropractic care. She arrives \"feeling really good\" today. Reports she has been focusing on her health and well being even moreso. States she is walking about 2 miles a day - reporting over 40 miles this month alone. She continues to use her trampoline for exercise as well. She has found physical therapy helpful in maintaining and supporting core strength. Denies other changes to health.    Objective   Physical Exam  Neurological:      General: No focal deficit present.      Mental Status: She is alert and oriented to person, place, and time.      Cranial Nerves: No dysarthria or facial asymmetry.      Sensory: Sensation is intact.      Motor: Motor function is intact.      Coordination: Coordination is intact.      Gait: Gait is intact. Gait normal.         Palpation of the following region(s) revealed:  Cervical: Upper trapezius right, muscular hypertonicity.  Cervical paraspinals bilateral, muscular hypertonicity.  Thoracic: Thoracic paraspinals right, muscular hypertonicity.  Rhomboids bilateral, muscular hypertonicity.  Lumbar: Lumbar paraspinals right, muscular hypertonicity.  Quadratus lumborum right, muscular hypertonicity.  Gluteal bilateral, muscular hypertonicity.        Segmental Joint(s): Segmental joint dysfunction was assessed with motion palpation and is identified in the following areas:  Cervical : C5  Thoracic : T3, T4, T7, and T9  Lumbopelvic / Sacral SIJ : L5 and R SIJ      Assessment/Plan   Today's Treatment Included: Chiropractic manipulation to the Segmental Joint(s) Cervical : C5 Segmental Joint(s) Lumbopelvic/Sacral SIJ : L5 and R SIJ Segmental Joint(s) Thoracic : T3, T4, T7, and T9   Treatment Techniques Used : Direct " Non-force technique, Activator/Tool assisted technique, and Low force    Soft-tissue mobilization was performed in the following areas:   Cervical paraspinal mm. bilateral and Upper Trapezius bilateral  Middle Trapezius bilateral, Rhomboids bilateral, and Pectoralis bilateral  Lumbar Paraspinal mm. bilateral, Quadratus Lumborum bilateral, and Gluteal mm. Glute. Med. bilateral      F/U in 4-6 weeks    The patient tolerated today's treatment with little or no additional discomfort and was instructed to contact the office for questions or concerns.

## 2024-10-16 ENCOUNTER — APPOINTMENT (OUTPATIENT)
Dept: INTEGRATIVE MEDICINE | Facility: CLINIC | Age: 84
End: 2024-10-16
Payer: MEDICARE

## 2024-10-16 DIAGNOSIS — M99.03 SEGMENTAL AND SOMATIC DYSFUNCTION OF LUMBAR REGION: Primary | ICD-10-CM

## 2024-10-16 DIAGNOSIS — M53.3 SACROILIAC DYSFUNCTION: ICD-10-CM

## 2024-10-16 DIAGNOSIS — M99.01 SEGMENTAL DYSFUNCTION OF CERVICAL REGION: ICD-10-CM

## 2024-10-16 DIAGNOSIS — M99.02 SEGMENTAL AND SOMATIC DYSFUNCTION OF THORACIC REGION: ICD-10-CM

## 2024-10-16 DIAGNOSIS — M79.9 POSTURAL STRAIN: ICD-10-CM

## 2024-10-16 PROCEDURE — 98941 CHIROPRACT MANJ 3-4 REGIONS: CPT | Performed by: CHIROPRACTOR

## 2024-10-16 NOTE — PROGRESS NOTES
Subjective   Patient ID: Jovanna Bucio is a 84 y.o. female who presents October 16, 2024 for chiropractic care.    MCR    Today, the patient rates their degree of pain as a 2 out of 10 on the numeric pain rating scale.     HPI : Jovanna returns to my office for chiropractic care. She reports that she has not been able to walk outdoors as regularly due to the colder, rainy weather. She has stayed active through use of her trampoline at home and walking her staircase. Overall, she is feeling quite well physically. She has found chiropractic care helpful in allowing her to stay active. Denies other changes to health.    Objective   Physical Exam  Neurological:      General: No focal deficit present.      Mental Status: She is alert and oriented to person, place, and time.      Cranial Nerves: No dysarthria or facial asymmetry.      Sensory: Sensation is intact.      Motor: Motor function is intact.      Coordination: Coordination is intact.      Gait: Gait is intact. Gait normal.       Palpation of the following region(s) revealed:  Cervical: Upper trapezius right, muscular hypertonicity.  Cervical paraspinals bilateral, muscular hypertonicity.  Thoracic: Thoracic paraspinals right, muscular hypertonicity.  Rhomboids bilateral, muscular hypertonicity.  Lumbar: Lumbar paraspinals right, muscular hypertonicity.  Quadratus lumborum right, muscular hypertonicity.  Gluteal bilateral, muscular hypertonicity.        Segmental Joint(s): Segmental joint dysfunction was assessed with motion palpation and is identified in the following areas:  Cervical : C5  Thoracic : T3, T4, T7, and T9  Lumbopelvic / Sacral SIJ : L5 and R SIJ      Assessment/Plan   Today's Treatment Included: Chiropractic manipulation to the Segmental Joint(s) Cervical : C5 Segmental Joint(s) Lumbopelvic/Sacral SIJ : L5 and R SIJ Segmental Joint(s) Thoracic : T3, T4, T7, and T9   Treatment Techniques Used : Direct Non-force technique, Activator/Tool  assisted technique, and Low force    Soft-tissue mobilization was performed in the following areas:   Cervical paraspinal mm. bilateral and Upper Trapezius bilateral  Middle Trapezius bilateral, Rhomboids bilateral, and Pectoralis bilateral  Lumbar Paraspinal mm. bilateral, Quadratus Lumborum bilateral, and Gluteal mm. Glute. Med. bilateral  Psoas BL supine    F/U in 4-6 weeks    The patient tolerated today's treatment with little or no additional discomfort and was instructed to contact the office for questions or concerns.

## 2024-11-20 ENCOUNTER — APPOINTMENT (OUTPATIENT)
Dept: INTEGRATIVE MEDICINE | Facility: CLINIC | Age: 84
End: 2024-11-20
Payer: MEDICARE

## 2024-11-20 DIAGNOSIS — M53.3 SACROILIAC DYSFUNCTION: ICD-10-CM

## 2024-11-20 DIAGNOSIS — M99.02 SEGMENTAL AND SOMATIC DYSFUNCTION OF THORACIC REGION: ICD-10-CM

## 2024-11-20 DIAGNOSIS — M99.01 SEGMENTAL DYSFUNCTION OF CERVICAL REGION: ICD-10-CM

## 2024-11-20 DIAGNOSIS — M79.9 POSTURAL STRAIN: ICD-10-CM

## 2024-11-20 DIAGNOSIS — M99.03 SEGMENTAL AND SOMATIC DYSFUNCTION OF LUMBAR REGION: Primary | ICD-10-CM

## 2024-11-20 PROCEDURE — 98941 CHIROPRACT MANJ 3-4 REGIONS: CPT | Performed by: CHIROPRACTOR

## 2024-11-20 NOTE — PROGRESS NOTES
Subjective   Patient ID: Jovanna Bucio is a 84 y.o. female who presents November 20, 2024 for chiropractic care.    MCR    Today, the patient rates their degree of pain as a 2 out of 10 on the numeric pain rating scale.     HPI : Jovanna returns to my office for chiropractic care. She reports feeling slightly sore in her abdominal region after doing a set of crunches yesterday. Otherwise, she has been feeling well overall. She continues to stay active indoors walking her staircase and with use of her trampoline. She continues to find chiropractic care helpful in managing symptoms. Denies other changes to health.    Objective   Physical Exam  Neurological:      General: No focal deficit present.      Mental Status: She is alert and oriented to person, place, and time.      Cranial Nerves: No dysarthria or facial asymmetry.      Sensory: Sensation is intact.      Motor: Motor function is intact.      Coordination: Coordination is intact.      Gait: Gait is intact. Gait normal.         Palpation of the following region(s) revealed:  Cervical: Upper trapezius right, muscular hypertonicity.  Cervical paraspinals bilateral, muscular hypertonicity.  Thoracic: Thoracic paraspinals right, muscular hypertonicity.  Rhomboids bilateral, muscular hypertonicity.  Lumbar: Lumbar paraspinals right, muscular hypertonicity.  Quadratus lumborum right, muscular hypertonicity.  Gluteal bilateral, muscular hypertonicity.        Segmental Joint(s): Segmental joint dysfunction was assessed with motion palpation and is identified in the following areas:  Cervical : C5  Thoracic : T3, T4, T7, and T9  Lumbopelvic / Sacral SIJ : L5 and R SIJ      Assessment/Plan   Today's Treatment Included: Chiropractic manipulation to the Segmental Joint(s) Cervical : C5 Segmental Joint(s) Lumbopelvic/Sacral SIJ : L5 and R SIJ Segmental Joint(s) Thoracic : T3, T4, T7, and T9   Treatment Techniques Used : Direct Non-force technique, Activator/Tool  assisted technique, and Low force    Soft-tissue mobilization was performed in the following areas:   Cervical paraspinal mm. bilateral and Upper Trapezius bilateral  Middle Trapezius bilateral, Rhomboids bilateral, and Pectoralis bilateral  Lumbar Paraspinal mm. bilateral, Quadratus Lumborum bilateral, and Gluteal mm. Glute. Med. bilateral  Psoas BL supine    F/U in 4-6 weeks    The patient tolerated today's treatment with little or no additional discomfort and was instructed to contact the office for questions or concerns.

## 2024-12-11 ENCOUNTER — APPOINTMENT (OUTPATIENT)
Dept: INTEGRATIVE MEDICINE | Facility: CLINIC | Age: 84
End: 2024-12-11
Payer: MEDICARE

## 2024-12-11 DIAGNOSIS — M53.3 SACROILIAC DYSFUNCTION: ICD-10-CM

## 2024-12-11 DIAGNOSIS — M99.01 SEGMENTAL DYSFUNCTION OF CERVICAL REGION: ICD-10-CM

## 2024-12-11 DIAGNOSIS — M79.9 POSTURAL STRAIN: ICD-10-CM

## 2024-12-11 DIAGNOSIS — M99.02 SEGMENTAL AND SOMATIC DYSFUNCTION OF THORACIC REGION: ICD-10-CM

## 2024-12-11 DIAGNOSIS — M99.03 SEGMENTAL AND SOMATIC DYSFUNCTION OF LUMBAR REGION: Primary | ICD-10-CM

## 2024-12-11 PROCEDURE — 98941 CHIROPRACT MANJ 3-4 REGIONS: CPT | Performed by: CHIROPRACTOR

## 2024-12-11 NOTE — PROGRESS NOTES
Subjective   Patient ID: Jovanna Bucio is a 84 y.o. female who presents December 11, 2024 for chiropractic care.    MCR    Today, the patient rates their degree of pain as a 4 out of 10 on the numeric pain rating scale.     HPI : Jovanna returns to my office for chiropractic care. She arrives today with main complaint of right leg swelling and ankle discomfort. She reports that she attended her family's Thanksgiving, eating all the offerings for a few days after as leftovers as well. A lot of the foods were things she normally doesn't eat, like meat and dairy. She reports experiencing swelling in her right foot and ankle and into the leg in the few days following this. This has caused her to walk slightly off kilter due to the swelling. Symptoms are improving overall. She is currently on an antibiotic prescribed by her dentist. No other changes to health noted.    Objective   Physical Exam  Neurological:      General: No focal deficit present.      Mental Status: She is alert and oriented to person, place, and time.      Cranial Nerves: No dysarthria or facial asymmetry.      Sensory: Sensation is intact.      Motor: Motor function is intact.      Coordination: Coordination is intact.      Gait: Gait is intact. Gait normal.         Palpation of the following region(s) revealed:  Cervical: Upper trapezius right, muscular hypertonicity.  Cervical paraspinals bilateral, muscular hypertonicity.  Thoracic: Thoracic paraspinals right, muscular hypertonicity.  Rhomboids bilateral, muscular hypertonicity.  Lumbar: Lumbar paraspinals right, muscular hypertonicity.  Quadratus lumborum right, muscular hypertonicity.  Gluteal bilateral, muscular hypertonicity.        Segmental Joint(s): Segmental joint dysfunction was assessed with motion palpation and is identified in the following areas:  Cervical : C5  Thoracic : T3, T4, T7, and T9  Lumbopelvic / Sacral SIJ : L5 and R SIJ      Assessment/Plan   Today's Treatment  Included: Chiropractic manipulation to the Segmental Joint(s) Cervical : C5 Segmental Joint(s) Lumbopelvic/Sacral SIJ : L5 and R SIJ Segmental Joint(s) Thoracic : T3, T4, T7, and T9   Treatment Techniques Used : Direct Non-force technique, Activator/Tool assisted technique, and Low force    Soft-tissue mobilization was performed in the following areas:   Cervical paraspinal mm. bilateral and Upper Trapezius bilateral  Middle Trapezius bilateral, Rhomboids bilateral, and Pectoralis bilateral  Lumbar Paraspinal mm. bilateral, Quadratus Lumborum bilateral, and Gluteal mm. Glute. Med. bilateral    F/U in 4-6 weeks    The patient tolerated today's treatment with little or no additional discomfort and was instructed to contact the office for questions or concerns.

## 2025-01-15 ENCOUNTER — APPOINTMENT (OUTPATIENT)
Dept: INTEGRATIVE MEDICINE | Facility: CLINIC | Age: 85
End: 2025-01-15
Payer: MEDICARE

## 2025-01-15 DIAGNOSIS — M53.3 SACROILIAC DYSFUNCTION: ICD-10-CM

## 2025-01-15 DIAGNOSIS — M99.02 SEGMENTAL AND SOMATIC DYSFUNCTION OF THORACIC REGION: ICD-10-CM

## 2025-01-15 DIAGNOSIS — M79.9 POSTURAL STRAIN: ICD-10-CM

## 2025-01-15 DIAGNOSIS — M99.01 SEGMENTAL DYSFUNCTION OF CERVICAL REGION: ICD-10-CM

## 2025-01-15 DIAGNOSIS — M99.03 SEGMENTAL AND SOMATIC DYSFUNCTION OF LUMBAR REGION: Primary | ICD-10-CM

## 2025-01-15 PROCEDURE — 98941 CHIROPRACT MANJ 3-4 REGIONS: CPT | Performed by: CHIROPRACTOR

## 2025-02-12 ENCOUNTER — APPOINTMENT (OUTPATIENT)
Dept: INTEGRATIVE MEDICINE | Facility: CLINIC | Age: 85
End: 2025-02-12
Payer: MEDICARE

## 2025-03-12 ENCOUNTER — APPOINTMENT (OUTPATIENT)
Dept: INTEGRATIVE MEDICINE | Facility: CLINIC | Age: 85
End: 2025-03-12
Payer: MEDICARE

## 2025-03-12 DIAGNOSIS — M99.02 SEGMENTAL AND SOMATIC DYSFUNCTION OF THORACIC REGION: ICD-10-CM

## 2025-03-12 DIAGNOSIS — M79.9 POSTURAL STRAIN: ICD-10-CM

## 2025-03-12 DIAGNOSIS — M53.3 SACROILIAC DYSFUNCTION: ICD-10-CM

## 2025-03-12 DIAGNOSIS — M99.01 SEGMENTAL DYSFUNCTION OF CERVICAL REGION: ICD-10-CM

## 2025-03-12 DIAGNOSIS — M99.03 SEGMENTAL AND SOMATIC DYSFUNCTION OF LUMBAR REGION: Primary | ICD-10-CM

## 2025-03-12 PROCEDURE — 98941 CHIROPRACT MANJ 3-4 REGIONS: CPT | Performed by: CHIROPRACTOR

## 2025-03-12 NOTE — PROGRESS NOTES
Subjective   Patient ID: Jovanna Bucio is a 84 y.o. female who presents March 12, 2025 for chiropractic care.    MCR    Today, the patient rates their degree of pain as a 1 out of 10 on the numeric pain rating scale.     HPI : Jovanna returns to my office for chiropractic care. She had the flu a few weeks ago but is feeling better overall. She has continued to stay as active as she can doing daily exercise of at least 30 minutes. No new complaints reports.     Objective   Physical Exam  Neurological:      General: No focal deficit present.      Mental Status: She is alert and oriented to person, place, and time.      Cranial Nerves: No dysarthria or facial asymmetry.      Sensory: Sensation is intact.      Motor: Motor function is intact.      Coordination: Coordination is intact.      Gait: Gait is intact. Gait normal.         Palpation of the following region(s) revealed:  Cervical: Upper trapezius right, muscular hypertonicity.  Cervical paraspinals bilateral, muscular hypertonicity.  Thoracic: Thoracic paraspinals right, muscular hypertonicity.  Rhomboids bilateral, muscular hypertonicity.  Latissimus dorsi bilateral, muscular hypertonicity.  Lumbar: Lumbar paraspinals right, muscular hypertonicity.  Quadratus lumborum right, muscular hypertonicity.  Gluteal bilateral, muscular hypertonicity.        Segmental Joint(s): Segmental joint dysfunction was assessed with motion palpation and is identified in the following areas:  Cervical : C6  Thoracic : T2., T3, T4, and T9  Lumbopelvic / Sacral SIJ : L4, L5, and R SIJ      Assessment/Plan   Today's Treatment Included: Chiropractic manipulation to the Segmental Joint(s) Cervical : C6 Segmental Joint(s) Lumbopelvic/Sacral SIJ : L4, L5, and R SIJ Segmental Joint(s) Thoracic : T2., T3, T4, and T9   Treatment Techniques Used : Direct Non-force technique, Activator/Tool assisted technique, and Low force    Soft-tissue mobilization was performed in the following  areas:   Cervical paraspinal mm. bilateral and Upper Trapezius bilateral  Middle Trapezius bilateral, Rhomboids bilateral, and Pectoralis bilateral  Lumbar Paraspinal mm. bilateral, Quadratus Lumborum bilateral, and Gluteal mm. Glute. Med. bilateral    F/U in 4-6 weeks    The patient tolerated today's treatment with little or no additional discomfort and was instructed to contact the office for questions or concerns.

## 2025-04-28 ENCOUNTER — APPOINTMENT (OUTPATIENT)
Dept: PRIMARY CARE | Facility: CLINIC | Age: 85
End: 2025-04-28
Payer: MEDICARE

## 2025-04-28 VITALS
DIASTOLIC BLOOD PRESSURE: 70 MMHG | TEMPERATURE: 97 F | RESPIRATION RATE: 14 BRPM | HEART RATE: 78 BPM | BODY MASS INDEX: 24.14 KG/M2 | OXYGEN SATURATION: 99 % | SYSTOLIC BLOOD PRESSURE: 130 MMHG | WEIGHT: 132 LBS

## 2025-04-28 DIAGNOSIS — N18.31 CHRONIC KIDNEY DISEASE, STAGE 3A (MULTI): ICD-10-CM

## 2025-04-28 DIAGNOSIS — Z91.81 AT MAXIMUM RISK FOR FALL: ICD-10-CM

## 2025-04-28 DIAGNOSIS — Z13.220 SCREENING FOR HYPERLIPIDEMIA: ICD-10-CM

## 2025-04-28 DIAGNOSIS — Z13.89 ENCOUNTER FOR SCREENING FOR OTHER DISORDER: ICD-10-CM

## 2025-04-28 DIAGNOSIS — Z78.0 ASYMPTOMATIC MENOPAUSE: ICD-10-CM

## 2025-04-28 DIAGNOSIS — R41.89 COGNITIVE DECLINE: ICD-10-CM

## 2025-04-28 DIAGNOSIS — Z00.00 ENCOUNTER FOR MEDICARE ANNUAL WELLNESS EXAM: Primary | ICD-10-CM

## 2025-04-28 DIAGNOSIS — Z66 DNR (DO NOT RESUSCITATE): ICD-10-CM

## 2025-04-28 PROCEDURE — 1159F MED LIST DOCD IN RCRD: CPT | Performed by: FAMILY MEDICINE

## 2025-04-28 PROCEDURE — G0444 DEPRESSION SCREEN ANNUAL: HCPCS | Performed by: FAMILY MEDICINE

## 2025-04-28 PROCEDURE — 1170F FXNL STATUS ASSESSED: CPT | Performed by: FAMILY MEDICINE

## 2025-04-28 PROCEDURE — 1123F ACP DISCUSS/DSCN MKR DOCD: CPT | Performed by: FAMILY MEDICINE

## 2025-04-28 PROCEDURE — 1036F TOBACCO NON-USER: CPT | Performed by: FAMILY MEDICINE

## 2025-04-28 PROCEDURE — G0439 PPPS, SUBSEQ VISIT: HCPCS | Performed by: FAMILY MEDICINE

## 2025-04-28 PROCEDURE — 1158F ADVNC CARE PLAN TLK DOCD: CPT | Performed by: FAMILY MEDICINE

## 2025-04-28 PROCEDURE — G0446 INTENS BEHAVE THER CARDIO DX: HCPCS | Performed by: FAMILY MEDICINE

## 2025-04-28 PROCEDURE — 1160F RVW MEDS BY RX/DR IN RCRD: CPT | Performed by: FAMILY MEDICINE

## 2025-04-28 ASSESSMENT — ACTIVITIES OF DAILY LIVING (ADL)
GROCERY_SHOPPING: INDEPENDENT
DOING_HOUSEWORK: INDEPENDENT
DRESSING: INDEPENDENT
TAKING_MEDICATION: INDEPENDENT
BATHING: INDEPENDENT
MANAGING_FINANCES: INDEPENDENT

## 2025-04-28 ASSESSMENT — PATIENT HEALTH QUESTIONNAIRE - PHQ9
1. LITTLE INTEREST OR PLEASURE IN DOING THINGS: NOT AT ALL
2. FEELING DOWN, DEPRESSED OR HOPELESS: NOT AT ALL
SUM OF ALL RESPONSES TO PHQ9 QUESTIONS 1 AND 2: 0

## 2025-04-28 NOTE — ASSESSMENT & PLAN NOTE
Orders:    Basic Metabolic Panel; Future    Lipid Panel; Future    CBC and Auto Differential; Future

## 2025-04-28 NOTE — PATIENT INSTRUCTIONS
"Labs today    Declined all vaccinations     Referral for fall clinic    Scanned advance directive  and DNR to chart    Check out the \"Live [employer]: [employer]\"    Referral for neurology for your memory  - avoid drinking late to prevent urination at night  - try to learn something new        Please follow up in   - 6 months for falls/ balance/CKD 3  - 1 yr for media[facility] wellness  - or as needed.       ** If labs or imaging ordered at today's visit, all the non-urgent results will be discussed at your next visit    If you have been referred for a special test or to a specialist please call  [phone] to schedule an appointment.  If you have any further questions, or if develop new or worsened symptoms, please give our office a call at [phone].           Ways to Help Prevent Falls at Home    Quick Tips   ? Ask for help if you need it. Most people want to help!   ? Get up slowly after sitting or laying down   ? Wear a medical alert device or keep cell phone in your pocket   ? Use night lights, especially areas near a bathroom   ? Keep the items you use often within reach on a small stool or end table   ? Use an assistive device such as walker or cane, as directed by provider/physical therapy   ? Use a non-slip mat and grab bars in your bathroom. Look for home health sections for best options     Other Areas to Focus On   ? Exercise and nutrition: Regular exercise or taking a falls prevention class are great ways improve strength and balance. Don’t forget to stay hydrated and bring a snack!   ? Medicine side effects: Some medicines can make you sleepy or dizzy, which could cause a fall. Ask your healthcare provider about the side effects your medicines could cause. Be sure to let them know if you take any vitamins or supplements as well.   ? Tripping hazards: Remove items you could trip on, such as loose mats, rugs, cords, and clutter. Wear closed toe shoes with rubber soles.   ? Health and " wellness: Get regular checkups with your healthcare provider, plus routine vision and hearing screenings. Talk with your healthcare provider about:   o Your medicines and the possible side effects - bring them in a bag if that is easier!   o Problems with balance or feeling dizzy   o Ways to promote bone health, such as Vitamin D and calcium supplements   o Questions or concerns about falling     *Ask your healthcare team if you have questions     ©[facility], 2022

## 2025-04-28 NOTE — PROGRESS NOTES
Subjective   Reason for Visit: Jovanna Bucio is an 84 y.o. female here for a Medicare Wellness visit.     Past Medical, Surgical, and Family History reviewed and updated in chart.    Reviewed all medications by prescribing practitioner or clinical pharmacist (such as prescriptions, OTCs, herbal therapies and supplements) and documented in the medical record.    HPI    Patient Care Team:  Khloe Hawkins DO as PCP - General  Khloe Hawkins DO as PCP - [facility] Attributed Provider     Poor balance  Completed PT in the past  Just completed a wellness retreat    Declined all vaccinations     Has been with some memory decline    Review of Systems  All systems reviewed and neg if not noted in the HPI above     Objective   Vitals:  /70 (Patient Position: Sitting)   Pulse 78   Temp 36.1 °C (97 °F)   Resp 14   Wt 59.9 kg (132 lb)   SpO2 99%   BMI 24.14 kg/m²       Physical Exam  Constitutional:       Appearance: Normal appearance.   HENT:      Head: Normocephalic.      Right Ear: External ear normal.      Left Ear: External ear normal.      Nose: Nose normal.      Mouth/Throat:      Pharynx: Oropharynx is clear.   Eyes:      Extraocular Movements: Extraocular movements intact.   Neck:      Thyroid: No thyroid mass, thyromegaly or thyroid tenderness.      Vascular: No carotid bruit.   Cardiovascular:      Rate and Rhythm: Normal rate and regular rhythm.      Heart sounds: Normal heart sounds. No murmur heard.  Pulmonary:      Effort: Pulmonary effort is normal. No respiratory distress.      Breath sounds: Normal breath sounds. No wheezing.   Abdominal:      General: Bowel sounds are normal.      Palpations: Abdomen is soft. There is no mass.      Tenderness: There is no abdominal tenderness.   Musculoskeletal:         General: Normal range of motion.      Cervical back: Normal range of motion.      Right lower leg: No edema.      Left lower leg: No edema.   Skin:     General: Skin is warm and dry.       Findings: No rash.   Neurological:      General: No focal deficit present.      Mental Status: She is alert and oriented to person, place, and time.      Motor: No weakness.      Comments: Memory test:  Clock drawing- poor spacing and incorrect number placement  3 item recall- 3/3   Psychiatric:         Mood and Affect: Mood normal.         Behavior: Behavior normal.         Assessment & Plan  Encounter for Medicare annual wellness exam    Orders:    Basic Metabolic Panel; Future    Lipid Panel; Future    CBC and Auto Differential; Future    Encounter for screening for other disorder         Chronic kidney disease, stage 3a (Multi)    Orders:    Basic Metabolic Panel; Future    Lipid Panel; Future    CBC and Auto Differential; Future    Screening for hyperlipidemia    Orders:    Lipid Panel; Future    Asymptomatic menopause    Orders:    XR DEXA bone density; Future    At maximum risk for fall    Orders:    Referral to Falls Clinic; Future    DNR (do not resuscitate)         Cognitive decline    Orders:    Referral to Neurology; Future           Cardiac Risk Assessment  15 - 20 minutes were spent discussing Cardiovascular risk and, if needed, lifestyle modifications were recommended, including nutritional choices, exercise, and elimination of habits contributing to risk.   Aspirin use/disuse was discussed following the guidelines below:  low dose ASA ( mg) should be considered:    If prior Heart Attack/Stroke/Peripheral vascular disease:  Generally recommend daily low dose aspirin unless extremely high bleeding risk (e.g., gastrointestinal).    If no prior Heart Attack/Stroke/Peripheral vascular disease:              Age over 70: Do not use Aspirin for prevention    Age less than 70 and 10-year cardiovascular disease risk is >20%: use low dose Aspirin for prevention.                 Depression Screening  5 - 10 minutes were spent screening for depression.     Patient was identified as a fall risk. Risk  prevention instructions provided.      Please follow up in   - 6 months for falls/ balance/CKD 3  - 1 yr for mediare wellness  - or as needed.

## 2025-05-07 ENCOUNTER — APPOINTMENT (OUTPATIENT)
Dept: INTEGRATIVE MEDICINE | Facility: CLINIC | Age: 85
End: 2025-05-07
Payer: MEDICARE

## 2025-05-07 DIAGNOSIS — M79.9 POSTURAL STRAIN: ICD-10-CM

## 2025-05-07 DIAGNOSIS — M99.03 SEGMENTAL AND SOMATIC DYSFUNCTION OF LUMBAR REGION: Primary | ICD-10-CM

## 2025-05-07 DIAGNOSIS — M54.59 MECHANICAL LOW BACK PAIN: ICD-10-CM

## 2025-05-07 DIAGNOSIS — M99.02 SEGMENTAL AND SOMATIC DYSFUNCTION OF THORACIC REGION: ICD-10-CM

## 2025-05-07 DIAGNOSIS — M99.01 SEGMENTAL DYSFUNCTION OF CERVICAL REGION: ICD-10-CM

## 2025-05-07 DIAGNOSIS — M53.3 SACROILIAC DYSFUNCTION: ICD-10-CM

## 2025-05-07 PROCEDURE — 98941 CHIROPRACT MANJ 3-4 REGIONS: CPT | Performed by: CHIROPRACTOR

## 2025-05-07 NOTE — PROGRESS NOTES
Subjective   Patient ID: Jovanna Bucio is a 84 y.o. female who presents May 7, 2025 for chiropractic care.    MCR    Today, the patient rates their degree of pain as a 3 out of 10 on the numeric pain rating scale.     HPI : Jovanna returns to my office for chiropractic care. Main complaint today is lower back discomfort that she believes began shortly after working on cleaning her house and going up and down the stairs from the second floor to the basement numerous times. No radicular complaints reported. She has continued to stay as active as possible. No other changes in health noted.    Objective   Physical Exam  Neurological:      General: No focal deficit present.      Mental Status: She is alert and oriented to person, place, and time.      Cranial Nerves: No dysarthria or facial asymmetry.      Sensory: Sensation is intact.      Motor: Motor function is intact.      Coordination: Coordination is intact.      Gait: Gait is intact. Gait normal.         Palpation of the following region(s) revealed:  Cervical: Upper trapezius right, muscular hypertonicity.  Cervical paraspinals bilateral, muscular hypertonicity.  Thoracic: Thoracic paraspinals right, muscular hypertonicity.  Rhomboids bilateral, muscular hypertonicity.  Latissimus dorsi bilateral, muscular hypertonicity.  Lumbar: Lumbar paraspinals right, muscular hypertonicity.  Quadratus lumborum right, muscular hypertonicity.  Gluteal bilateral, muscular hypertonicity.        Segmental Joint(s): Segmental joint dysfunction was assessed with motion palpation and is identified in the following areas:  Cervical : C4 C5  Thoracic : T2., T3, T4, and T9  Lumbopelvic / Sacral SIJ : L3, L4, L5/S1, and R SIJ      Assessment/Plan   Today's Treatment Included: Chiropractic manipulation to the Segmental Joint(s) Cervical : C4 C5 Segmental Joint(s) Lumbopelvic/Sacral SIJ : L3, L4, L5/S1, and R SIJ Segmental Joint(s) Thoracic : T2., T3, T4, and T9   Treatment  Techniques Used : Direct Non-force technique, Activator/Tool assisted technique, and Low force    Soft-tissue mobilization was performed in the following areas:   Cervical paraspinal mm. bilateral and Upper Trapezius bilateral  Middle Trapezius bilateral, Rhomboids bilateral, and Pectoralis bilateral  Lumbar Paraspinal mm. bilateral, Quadratus Lumborum bilateral, and Gluteal mm. Glute. Med. bilateral    F/U in 4-6 weeks    The patient tolerated today's treatment with little or no additional discomfort and was instructed to contact the office for questions or concerns.

## 2025-06-11 ENCOUNTER — DOCUMENTATION (OUTPATIENT)
Dept: PHYSICAL THERAPY | Facility: CLINIC | Age: 85
End: 2025-06-11

## 2025-06-11 ENCOUNTER — APPOINTMENT (OUTPATIENT)
Dept: INTEGRATIVE MEDICINE | Facility: CLINIC | Age: 85
End: 2025-06-11
Payer: MEDICARE

## 2025-06-11 DIAGNOSIS — M99.03 SEGMENTAL AND SOMATIC DYSFUNCTION OF LUMBAR REGION: Primary | ICD-10-CM

## 2025-06-11 DIAGNOSIS — M99.02 SEGMENTAL AND SOMATIC DYSFUNCTION OF THORACIC REGION: ICD-10-CM

## 2025-06-11 DIAGNOSIS — M99.01 SEGMENTAL DYSFUNCTION OF CERVICAL REGION: ICD-10-CM

## 2025-06-11 DIAGNOSIS — M54.59 MECHANICAL LOW BACK PAIN: ICD-10-CM

## 2025-06-11 DIAGNOSIS — M53.3 SACROILIAC DYSFUNCTION: ICD-10-CM

## 2025-06-11 PROCEDURE — 98941 CHIROPRACT MANJ 3-4 REGIONS: CPT | Performed by: CHIROPRACTOR

## 2025-06-11 NOTE — PROGRESS NOTES
Subjective   Patient ID: Jovanan Bucio is a 84 y.o. female who presents June 11, 2025 for chiropractic care.    MCR    Today, the patient rates their degree of pain as a 2 out of 10 on the numeric pain rating scale.     HPI : Jovanna returns to my office for chiropractic care. She is doing well and staying active through walking and regular exercise. Will be attending a fall clinic tomorrow through . She arrives for care of intermittent lower back discomfort. No radicular complaints reported. No other changes in health noted.    Objective   Physical Exam  Neurological:      General: No focal deficit present.      Mental Status: She is alert and oriented to person, place, and time.      Cranial Nerves: No dysarthria or facial asymmetry.      Sensory: Sensation is intact.      Motor: Motor function is intact.      Coordination: Coordination is intact.      Gait: Gait is intact. Gait normal.         Palpation of the following region(s) revealed:  Cervical: Upper trapezius right, muscular hypertonicity.  Cervical paraspinals bilateral, muscular hypertonicity.  Thoracic: Thoracic paraspinals right, muscular hypertonicity.  Rhomboids bilateral, muscular hypertonicity.  Latissimus dorsi bilateral, muscular hypertonicity.  Lumbar: Lumbar paraspinals right, muscular hypertonicity.  Quadratus lumborum right, muscular hypertonicity.  Gluteal bilateral, muscular hypertonicity.        Segmental Joint(s): Segmental joint dysfunction was assessed with motion palpation and is identified in the following areas:  Cervical : C5  Thoracic : T3, T4, T5, T9, and T12  Lumbopelvic / Sacral SIJ : L1, L3, L4, L5/S1, and R SIJ      Assessment/Plan   Today's Treatment Included: Chiropractic manipulation to the Segmental Joint(s) Cervical : C5 Segmental Joint(s) Lumbopelvic/Sacral SIJ : L1, L3, L4, L5/S1, and R SIJ Segmental Joint(s) Thoracic : T3, T4, T5, T9, and T12   Treatment Techniques Used : Direct Non-force technique,  Activator/Tool assisted technique, and Low force    Soft-tissue mobilization was performed in the following areas:   Cervical paraspinal mm. bilateral and Upper Trapezius bilateral  Middle Trapezius bilateral, Rhomboids bilateral, and Pectoralis bilateral  Lumbar Paraspinal mm. bilateral, Quadratus Lumborum bilateral, and Gluteal mm. Glute. Med. bilateral    F/U in 4-6 weeks    The patient tolerated today's treatment with little or no additional discomfort and was instructed to contact the office for questions or concerns.

## 2025-06-12 ENCOUNTER — DOCUMENTATION (OUTPATIENT)
Dept: PHARMACY | Facility: HOSPITAL | Age: 85
End: 2025-06-12

## 2025-06-12 ENCOUNTER — OFFICE VISIT (OUTPATIENT)
Dept: GERIATRIC MEDICINE | Facility: CLINIC | Age: 85
End: 2025-06-12
Payer: MEDICARE

## 2025-06-12 ENCOUNTER — OFFICE VISIT (OUTPATIENT)
Dept: OTOLARYNGOLOGY | Facility: CLINIC | Age: 85
End: 2025-06-12
Payer: MEDICARE

## 2025-06-12 VITALS
RESPIRATION RATE: 15 BRPM | BODY MASS INDEX: 25.02 KG/M2 | SYSTOLIC BLOOD PRESSURE: 167 MMHG | TEMPERATURE: 98.2 F | HEART RATE: 75 BPM | WEIGHT: 136.8 LBS | DIASTOLIC BLOOD PRESSURE: 82 MMHG

## 2025-06-12 DIAGNOSIS — M53.3 SACROILIAC DYSFUNCTION: ICD-10-CM

## 2025-06-12 DIAGNOSIS — R03.0 ELEVATED BP WITHOUT DIAGNOSIS OF HYPERTENSION: ICD-10-CM

## 2025-06-12 DIAGNOSIS — Z91.81 AT MAXIMUM RISK FOR FALL: ICD-10-CM

## 2025-06-12 DIAGNOSIS — M85.80 OSTEOPENIA, UNSPECIFIED LOCATION: ICD-10-CM
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
## 2025-06-12 DIAGNOSIS — M79.9 POSTURAL STRAIN: ICD-10-CM
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
## 2025-06-12 PROCEDURE — 1159F MED LIST DOCD IN RCRD: CPT | Performed by: NURSE PRACTITIONER

## 2025-06-12 PROCEDURE — 99215 OFFICE O/P EST HI 40 MIN: CPT | Performed by: NURSE PRACTITIONER

## 2025-06-12 PROCEDURE — 99205 OFFICE O/P NEW HI 60 MIN: CPT | Performed by: NURSE PRACTITIONER

## 2025-06-12 PROCEDURE — 99214 OFFICE O/P EST MOD 30 MIN: CPT | Performed by: NURSE PRACTITIONER

## 2025-06-12 PROCEDURE — 1160F RVW MEDS BY RX/DR IN RCRD: CPT | Performed by: NURSE PRACTITIONER

## 2025-06-12 PROCEDURE — 1157F ADVNC CARE PLAN IN RCRD: CPT | Performed by: NURSE PRACTITIONER

## 2025-06-12 PROCEDURE — 1036F TOBACCO NON-USER: CPT | Performed by: NURSE PRACTITIONER

## 2025-06-12 RX ORDER — ASCORBIC ACID 500 MG
500 TABLET ORAL DAILY
COMMUNITY

## 2025-06-12 RX ORDER — COD LIVER OIL
15 OIL (ML) ORAL
COMMUNITY

## 2025-06-12 RX ORDER — CALCIUM CARBONATE 300MG(750)
400 TABLET,CHEWABLE ORAL DAILY
COMMUNITY

## 2025-06-12 ASSESSMENT — ENCOUNTER SYMPTOMS
OCCASIONAL FEELINGS OF UNSTEADINESS: 0
DEPRESSION: 0
LOSS OF SENSATION IN FEET: 0

## 2025-06-12 NOTE — PROGRESS NOTES
Multidisciplinary Falls Clinic  Comprehensive Medication Review    Jovanna Bucio is a 84 y.o. female who was referred to the ambulatory Clinical Pharmacy Team for evaluation at the Falls Clinic.    Referring Provider: Dr. Khloe Hawkins    Specialists/Disciplines already seen/seeing outpatient:  Integrative Medicine (Chiropractor)    Past Medical History:  Jovanna has Arthropathy of hand; Bilateral high frequency sensorineural hearing loss; Chronic kidney disease, stage 3a (Multi); Diffuse myofascial pain syndrome; HLD (hyperlipidemia); Osteopenia; Postural strain; Sacroiliac dysfunction; Segmental and somatic dysfunction; Absence of menstruation; Adjustment reaction; Anemia, unspecified; Asymptomatic menopause; Muscle weakness; and Urge incontinence of urine on their problem list.     Past Surgical History:  She has a past surgical history that includes Hysterectomy (02/11/2014); Tonsillectomy (02/11/2014); and Adenoidectomy (02/11/2014).    History of Falls  Chart Review  Has patient fallen? yes  April 2025- Jovanna fell while running down a hill at a [facility] [place], slipped once again in the gravel while trying to get up. Scarred her knee, and did bruise up her arm.     Patient Interview  Is now very cautious about falling  Focusing on picking up her feet now- was 'scuffling' feet outside and in house, working on confidently picking up feet.   Has been to Physical Therapy  Has been to [employer] classes  Walks every day  Gets on her trampoline (small)- health balancing  Lives alone    Pharmacy Miscellaneous  Affordability/Accessibility:  Are you able to afford your medications? N/A  Do you feel you need to ration your medications because of cost? N/A  Do you have trouble getting to your pharmacy? N/A    Adherence/Organization:  Fill History Reviewed: yes  What time do you usually take your medications? Starts in the morning  How often on an average per week does the patient forget to take a dose of  their medications? N/A  Do you receive your medications as a 30 day or 90 day supply? N/A  Do you ever experience running out of refills and receiving approval for refills? N/A  Do you utilize auto-fill services? N/A  Do you use a pillbox at home?  N/A  Is it weekly or monthly? N/A  Do you prepare your own pillbox? N/A    Current Medication/Environmental Allergies  Allergies[1]    Current Medications   Medications Ordered Prior to Encounter[2]     Medication Reconciliation  Added:   Multiple vitamins/herbals    Changed:   N/A    Removed:   N/A    Pill bottle review findings: None to review    Today:  Which medications did you take this morning? N/A  Did you skip any medications this morning? N/A    Medication List Analysis  Total number of medications/day (OTC and Rx): 2  Psychoactive medications (antipsychotics, sedatives, hypnotics, anxiolytics, ADTs):   None    Other medications that can contribute to fall risk:  None    PDMP Review  No results    Adverse  Medication interactions: None  Patient-reported side effects: None    Renal/Hepatic Dosing  Current GFR (reported): 53 mL/min/1.73 m2 as of 9/18/24  History of cirrhosis or hepatic dysfunction? None, per chart review  Are all medications dosed appropriately per current renal/hepatic function? Yes    Medication Efficacy  BG: A1c None labs  Lipids: LDL 90 mg/dL and triglycerides 58 mg/dL as of 8/28/23  Bleed risk: No additional risk with current medication use  Blood pressure:  Typical:   BP Readings from Last 10 Encounters:   04/28/25 130/70   06/05/24 145/89   04/22/24 138/70   04/17/23 132/70   07/15/22 108/86   01/14/22 136/70   07/30/21 160/87   07/14/21 126/60   06/14/21 154/82   02/09/21 148/72     Orthostatic hypotension as of today (06/12/25)? No  Position  Time  BP  Symptoms (if any noted)   Lying Down  5 min  /79   HR 79     Standing  1 min  /82   HR 66     Standing  3 min  /82   HR 75     Abnormal: drop in BP of >=20 mm Hg, or in  "diastolic BP of >=10 mm Hg, or lightheaded/dizzy     Other agents:  Tobacco Use: Not discussed  Alcohol Intake: Not discussed  Illicit Drug Use: Not discussed    Laboratory Results  Lab Results   Component Value Date    BILITOT 0.6 09/18/2024    CALCIUM 9.7 09/18/2024    CO2 28 09/18/2024     09/18/2024    CREATININE 1.03 09/18/2024    GLUCOSE 105 (H) 09/18/2024    ALKPHOS 54 09/18/2024    K 4.6 09/18/2024    PROT 7.0 09/18/2024     09/18/2024    AST 17 09/18/2024    ALT 13 09/18/2024    BUN 15 09/18/2024    ANIONGAP 14 09/18/2024    MG 2.10 11/06/2019    PHOS 3.2 04/22/2024    ALBUMIN 4.1 09/18/2024    GFRF 54 (A) 08/28/2023    EGFR 53 (L) 09/18/2024     Lab Results   Component Value Date    TRIG 58 08/28/2023    CHOL 177 08/28/2023    HDL 75.7 08/28/2023     No results found for: \"HGBA1C\"  Albumin/Creatinine Ratio   Date Value Ref Range Status   09/18/2024   Final     Comment:     One or more analytes used in this calculation is outside of the analytical measurement range. Calculation cannot be performed.     No results found for: \"WRHRLYQO13\"   Lab Results   Component Value Date    VITD25 40 06/14/2021       ASCVD Risk  The ASCVD Risk score (Doland DK, et al., 2019) failed to calculate for the following reasons:    The 2019 ASCVD risk score is only valid for ages 40 to 79    Bone Health   === 04/17/23 ===    DEXA BONE DENSITY    - Impression -  DEXA:  According to World Health Organization criteria,  classification is  low bone mass osteopenia.    Followup recommended in two years or sooner as clinically warranted.       Assessment/Plan   Problem List Items Addressed This Visit    None    General Patient Discussion and Findings  Jovanna did walk for an hour/exercise this morning before coming in, potentially contributing to higher blood pressure values.   Is getting up a lot overnight, sometimes every hour and a half    Recommendations to PCP  Continue all meds under the continuation of care with " the referring provider and clinical pharmacy team  No significant recommendations from Clinical Pharmacy  Labs  Collect A1c  Collect B12    Falls Clinic Follow-up  Multidisciplinary based on patient's needs. Please see geriatric's note for final summary and multidisciplinary recommendations that were provided to the patient. Patient has contact information to call with any medication questions or concerns.    Ivory Carreno, PharmD     Verbal consent to manage patient's drug therapy was obtained from the patient. They were informed they may decline to participate or withdraw from participation in pharmacy services at any time.       [1] No Known Allergies  [2]   Current Outpatient Medications on File Prior to Visit   Medication Sig Dispense Refill    cholecalciferol (Vitamin D-3) 25 MCG (1000 UT) capsule Take one(1) tablet daily.      diclofenac sodium (Voltaren) 1 % gel gel Apply 2 (TWO) grams of gel TO THE hands(S) THREE TIMES DAILY. Do not apply more than EIGHT grams per day to any one joint.       No current facility-administered medications on file prior to visit.

## 2025-06-12 NOTE — PATIENT INSTRUCTIONS
Falls Clinic Recommendations  We have identified the following risks related to falls after your multidisciplinary evaluation and recommend the following interventions.    1. Medications:   Please keep taking your medications as prescribed    2. Gait, Strength and Balance:     At this time you should continue the exercise program you are current using   A. [facility]  B.  Reflexology     -Continue to  your feet when walking.    3.             Your blood pressure was slightly elevated today, but you have done a 1 hour walk prior to our visit.  Continue to follow up with your PCP.  No concerns at this time.     4.             Home hazards and environment:  Reviewed                   Grab bars in the shower/tub:     Toilet:    Lighting:   Throw rugs:   Clutter:   Stairs:    5. Foot wear:                   Please avoid walking barefoot or with socks only   Avoid flip flops, sandals, shoes with heels   Get shoes with good ankle support and rubber soles         6. Vision:                   Deficits None    Up to date with eye exam    7. Vitamin D:   Recommend vitamin D level with next blood draw       8. Memory and cognition:                 None identified    Sincerely,    Melissa Simpson, MSN, APRN-BC

## 2025-06-12 NOTE — PROGRESS NOTES
"Subjective   Patient ID: Jovanna Bucio is a 84 y.o. female who presents for No chief complaint on file..  HPI  This patient presents to the multidisciplinary falls clinic due to history of a fall.  Patient states that in April she was traveling out of town and had a fall while running down a gravel hill.  When she tried to get up she fell a second time but then was able to get up on her own.  She feels that this was due to not picking up her feet.  Since then, she has been very conscious of making sure she picks up her feet when walking and endorses a fear of falling again.  Patient completed balance physical therapy last year but admits that she has not been doing her home exercises.    She denies any episodes of dizziness, lightheadedness, vertigo.  She denies any history of dizziness lasting several days.  She reports being told she qualified for hearing aids but was not interested in getting them.  She feels that she hears others \"just fine.\"  She denies any asymmetry in her hearing.  She denies any pressure induced dizziness.  She denies any dizziness with irregular heartbeats or shortness of breath.  She denies feeling depressed and/or anxious much of the time.    She does not think she would be dizzy and a wide open space with nothing to hold onto.  She denies feeling unsure of her footing when walking outside but is paying more attention to picking up her feet primarily the left.  She denies feeling unsteady on her feet all of the time.  She endorses occasional headaches that are not severe enough to affect her daily activities.  She denies any history of migraine.    Review of Systems  A comprehensive or 10 points review of the patient's constitutional, neurological, HEENT, pulmonary, cardiovascular and genito-urinary systems showed only those mentioned in history of present illness.    Objective   Physical Exam  Constitutional: no fever, chills, weight loss or weight gain   General appearance: " Appears well, well-nourished, well groomed. No acute distress.   Communication: Normal communication   Psychiatric: Oriented to person, place and time. Normal mood and affect.   Neurologic: Cranial nerves II-XII grossly intact and symmetric bilaterally.   Head and Face:   Head: Atraumatic with no masses, lesions or scarring.   Face: Normal symmetry, no paralysis, synkinesis or facial tic. No scars or deformities.     Eyes: Conjunctiva not edematous or erythematous   Ears: External inspection of ears with no deformity, scars or masses.   Nose: External inspection of nose: No nasal lesions, lacerations or scars.     Neck: Normal appearing, symmetric, trachea midline.   Cardiovascular: Examination of peripheral vascular system shows no clubbing or cyanosis.   Respiratory: No respiratory distress increased work of breathing. Inspection of the chest with symmetric chest expansion and normal respiratory effort.   Skin: No rashes in the head or neck    Bedside occulomotor function assessment for ocular pursuits and saccades, spontaneous nystagmus was normal.  Bilateral head thrust negative  Romberg slightly unsteady, patient swaying front to back but able to compensate  Fukuda normal  Tandem gait unsteady, patient leaning right than left but able to compensate  Mary Jo-Hallpike negative bilaterally  Logroll negative bilaterally    Assessment/Plan     This patient presents to the multidisciplinary falls clinic due to history of falling.    Based on her description of symptoms and focused peripheral vestibular exam, I do not feel that this patient has a peripheral vestibular disorder.  All questions regarding otologic balance issues were answered to patient's satisfaction.  My findings were discussed with the multidisciplinary team including geriatrics, pharmacy, physical therapy.      45 minutes was spent on this patient's visit. More than 50% of that time was spent in counseling regarding the possible etiologies, test results,  treatment options and coordinating care.    This note was created using speech recognition transcription software. Despite proofreading, several typographical errors might be present that might affect the meaning of the content. Please call with any questions.         AMELIA Montanez-CNP 06/12/25 10:47 AM

## 2025-06-12 NOTE — PROGRESS NOTES
Physical Therapy    Physical Therapy Falls Clinic Assessment    Jovanna uBcio was seen today for a physical therapy balance screen as part of a multidisciplinary assessment at the falls clinic. Jovanna Bucio was agreeable to participate.     Subjective    General:  Fall History:    -Have you fallen? Yes  Last fall: Time ago ___April__    yesTrip  running on gravel    -Do you feel unsteady? No  -Do you worry about falling? Yes - improving    Red Flags:       Jovanna Bucio  denies:  diplopia, drop attacks, dysphagia, and dizziness    Previous functional level: independent    Living Environment: multi-level house  Stairs to enter home: Yes  Rail present? Yes  Stairs within home:  Yes   Rail present? Yes    Family support: Yes adult daughter lives local    Current Level of Function:   IND with ADL's and IADL's: yes  Assistance required with: none  Driving: Yes  Grab bars in bathroom: Yes  Tub shower: Yes  Shower chair/seat: No  Raised toilet seat: No  AD/Equipment: no device  Routine physical activity: by walking 30-60 minutes/day  7 days/week     Objective     Functional Assessments:    Transfers: independent  Gait:  Quality of gait: normal pace, good stride length  Posture: mild pronation    Outcome Measures  FUNCTIONAL STRENGTH  30 Sec Chair Stand:  12 Rises. A below average score indicates risk for falls.   Chair Stand Below Average Scores   Age Men Women   85-89 <8 <8     STANCE  Bilateral Stance: 10 sec    Semi- tandem Stance: 10 sec    Tandem Stance  Tandem Stance: 10 sec.   An older adult who cannot hold the tandem stand for at least 10 seconds is at increased risk of falling    Single leg Balance Test   Single leg stand: 5 sec. Participants unable to perform one-leg stand for at least 5 seconds are increased risk for injurious fall    BALANCE WITH SENSORY INTERACTION  Modified Clinical Test of Sensory Interaction in Balance:      Eyes Open on land = 30 seconds  Eyes Closed on land = 30  seconds -Failure to maintain balance with eye closed on firm surface indicates visually dependent.   Eyes Open on airex = 30 seconds - Failure to maintain balance on foam surface indicate that visual and/or vestibular system is not be being used to maintain balance.    Eyes Closed on airex = 30 seconds - Inability to maintain standing on foam with eyes closed predicted future multiple falls  Total: 120/120 = scores < 120 indicate increased risk for falls    MOBILITY  Timed Up & Go: 9.79 sec. An older adult who takes >= 12 sec to complete the TUG is at risk for falling.       Based on Jovanna Bucio's performance on the above outcome measures, she is not at increased risk for falls.   oJvanna Bucio would not benefit from referral to physical therapy.     Plan      PT RECOMMENDATIONS:    Patient to increase consistency with previously issued HEP. Consider community based balance exercise programs.   Patient was educated on tips to avoid falls and injuries. Educational handouts were provided. Strategies for safety were discussed.    Patient was instructed to follow up with this physical therapist as needed.     Susan Ray, PT, DTP  Certified Exercise Expert for the Aging Adult  Certified Falls

## 2025-06-12 NOTE — PROGRESS NOTES
Some elements may have been copied from prior note(s). The elements have been updated and reflect current evaluation, examination and decision making from today.           Reason for visit: Multidisciplinary Falls Prevention Clinic Evaluation    Referring Provider: Dr. Khloe Hawkins       Summary Statement: Ms. Bucio is an 88 yo elderly woman with a PMH significant for an accidental fall, sacroiliac dysfunction with LBP,  Osteopenia, Vitamin D deficiency, HLD, Bilateral high frequency SNHL, and Urge incontinence.,who presents today for the Multidisciplinary Falls   Prevention Clinic Evaluation.     Fall History:   How many falls have you had in the last 6 months? 1  Description of fall events:   -She was attending a [employer]   -She was running late so started walking fast and fell  -Attempted to get up and then feet slipped on the gravel     Other relevant history:   -None      HOME ENVIRONMENT:  Type of dwelling: House   Patient Lives With: Self  Assistance Available:Yes  Entry To Home:   Number Of Stairs Into Home:   Number Of Stairs To Bed/Bath:   Tub/Shower Type:  Step In   Laundry: Independent  Durable Medical Equipment : None     PRIOR FUNCTIONAL LEVEL:  Independent with All BADL and IADLs     CURRENT FUNCTIONAL LEVEL:  Independent with All BADL and IADLs         Medications:  Total number of medications/day (OTC and prescribed) :6  Psychoactive medications? (antipsychotics, sedatives, hypnotic, anxiolytic, antidepressants) :m0  Other concerning medications: None  Compliance: Adherent    ETOH:N  Tobacco:N  Recreational drugs:N    Review of Systems  VISION:   visual disturbance: cataracts    bifocals:   N Last vision exam: up to date   HEARING:   Tinnitus: N hearing difficulties:N   Hearing Aids: N  RESPIRATORY:    cough:  N   shortness of breath:   N   wheezing: N  CV:    hx of arrhythmia:  N  hx of syncope:  N  palpitations: N dizziness/lightheadedness:  N chest pain:N  GI:   weight loss: N     constipation:  N   frequent bowels:    N eating habits: heart healthy diet   :  Urinary frequency:    N  Urinary leakage:   stress/urgency   Nocturia:  per night + every q 1-1 and 1/2 hours   MUSCULAR SKELETAL:   joint pain     PSYCH:   mood disorder: N sleep disturbance due to  nocturia   NEURO:   numbness or tingling:N  weakness: N  Skin:  nail problems:N  foot problems:N  diabetic ulcers:N        Physical Exam:  Orthostatic Vitals:     6/12/2025     9:55 AM 10:00 AM 10:05 AM      /79 146/82 167/82   BP Location Left arm -- --   Patient Position Lying Sitting Standing   BP Cuff Size Adult -- --   Heart Rate 79 66 75   Temp 36.8 °C (98.2 °F) -- --   Temp Source Tympanic -- --   Weight 62.1 kg (136 lb 12.8 oz) -- --   Resp 15Resp. 15. Has comment. Taken on 6/12/25 9:55 AM -- --     Constitutional:  Well-nourished, kempt  Head: normocephalic   Eyes: PERRLA, EOMI. Wears eyeglasses   ENT: TM's +LR b/l    Dentition: intact. No missing teeth     Oropharyx: clear. MMM.     Neck: supple. trachea midline. Thyroid not enlarged  Lymphatics: No adenopathy at neck  Respiratory: clear without rales, rhonchi or wheezes  Cardiovascular: RRR, +S1, S2, no murmurs appreciated, JVD physiologic    Carotids: upstroke nl. no bruit    Extremities:  No edema, clubbing, cyanosis WHITLEY x 4     Gait:  Steady   Gastrointestinal: +BS, soft, nontender.  Genitourinary: deferred  Integumentary: No ulcers, pressure sores, rash  Musculoskeletal:    Contractures: none    Muscle bulk: nl    Digits/nails: nl    Effusions: none   Neurologic:    CNII-XII: nl    tone: nl     Tremor: none    Strength UE: nl   5/5   LE:  nl 5/5     ROM:  Full to all extremities          FEAR of FALLING: No    Timed up and Go: 9.79 sec    An older adult who takes >= 12 sec to complete the TUG is at risk for falling.     30 Sec Chair Stand: 12 Rises. a below average score indicated risk of falling.   Chair Stand Below Average  Scores   Age Men Women   60-64 <14 <12   65-69 <12 <11   70-74 <12 <10   75-79 <11 <10   80-84 <10 <9   85-89 <8 <8   90-94 <7 <4     4 stage balance test:4 stage balance test: (Assesses Static Balance which is important for postural stability)    Feet side by side 10 sec.    Modified tandem stance 10 sec,     Tandem stand 10 sec  *An older adult who cannot hold the tandem stand for at least 10 seconds is at increased risk of falling      Single leg stand: 10 sec  *Participants unable to perform one-leg stand for at least 5 seconds are increased risk for injurious fall       Modified Clinical Test of Sensory Interaction in Balance:  Eyes open, firm surface 30 sec  Eyes closed, firm surface 30 sec  Eyes open, foam 30 sec  Eyes closed, foam 30 sec    Total: 120 /120 secs      Tests/Data Review:  BMD with FRAX score calculated: 10/2023 -osteopenia     Vitamin D25 level: no recent level     ASSESSMENT/PLAN  1. Accidental fall, initial encounter (Primary)  -no further recommendations  -patient walks 1 hour /day  -she is involved with [employer]  -She does  or has done [facility], Reflexology and Massage Therapy  -She is current seeing a Chiropractor and has a follow up appointment  -She is aware that she tends not to pick her feet and she makes a conscious effort to do so.       2. Osteopenia, unspecified location  -Dexa Scan is due   -Recommend a Vitamin D level with next blood draw     3. Sacroiliac dysfunction/Postural strain  -controlled         4. Elevated BP without diagnosis of hypertension  -elevated at the time of evaluation  -She had done a 1 hour walk prior to appointment this morning  -Historical BPs have been acceptable   -No further recommendations  -Follow up with PCP as recommended       Stable  Follow up with PCP as recommended

## 2025-07-09 ENCOUNTER — APPOINTMENT (OUTPATIENT)
Dept: INTEGRATIVE MEDICINE | Facility: CLINIC | Age: 85
End: 2025-07-09
Payer: MEDICARE

## 2025-07-09 DIAGNOSIS — M79.9 POSTURAL STRAIN: ICD-10-CM

## 2025-07-09 DIAGNOSIS — M99.03 SEGMENTAL AND SOMATIC DYSFUNCTION OF LUMBAR REGION: Primary | ICD-10-CM

## 2025-07-09 DIAGNOSIS — M99.02 SEGMENTAL AND SOMATIC DYSFUNCTION OF THORACIC REGION: ICD-10-CM

## 2025-07-09 DIAGNOSIS — M53.3 SACROILIAC DYSFUNCTION: ICD-10-CM

## 2025-07-09 DIAGNOSIS — M54.59 MECHANICAL LOW BACK PAIN: ICD-10-CM

## 2025-07-09 DIAGNOSIS — M99.01 SEGMENTAL DYSFUNCTION OF CERVICAL REGION: ICD-10-CM

## 2025-07-09 PROCEDURE — 98941 CHIROPRACT MANJ 3-4 REGIONS: CPT | Performed by: CHIROPRACTOR

## 2025-07-09 NOTE — PROGRESS NOTES
Subjective   Patient ID: Jovanna Bucio is a 85 y.o. female who presents July 9, 2025 for chiropractic care.    MCR    Today, the patient rates their degree of pain as a 2 out of 10 on the numeric pain rating scale.     HPI : Jovanna returns to my office for chiropractic care. She has continued to stay active overall with walking outdoors in the morning, using her treadmill and taking the steps around her home. She worked with the fall clinic and has been more conscious about picking up her feet when walking. No falls. No radicular complaints. No other changes in health noted.    Objective   Physical Exam  Neurological:      General: No focal deficit present.      Mental Status: She is alert and oriented to person, place, and time.      Cranial Nerves: No dysarthria or facial asymmetry.      Sensory: Sensation is intact.      Motor: Motor function is intact.      Coordination: Coordination is intact.      Gait: Gait is intact. Gait normal.         Palpation of the following region(s) revealed:  Cervical: Upper trapezius right, muscular hypertonicity.  Cervical paraspinals bilateral, muscular hypertonicity.  Thoracic: Thoracic paraspinals right, muscular hypertonicity.  Rhomboids bilateral, muscular hypertonicity.  Latissimus dorsi bilateral, muscular hypertonicity.  Lumbar: Lumbar paraspinals right, muscular hypertonicity.  Quadratus lumborum right, muscular hypertonicity.  Gluteal bilateral, muscular hypertonicity.        Segmental Joint(s): Segmental joint dysfunction was assessed with motion palpation and is identified in the following areas:  Cervical : C6  Thoracic : T3, T4, T5, T9, and T12  Lumbopelvic / Sacral SIJ : L1, L3, L4, L5/S1, and R SIJ      Assessment/Plan   Today's Treatment Included: Chiropractic manipulation to the Segmental Joint(s) Cervical : C6 Segmental Joint(s) Lumbopelvic/Sacral SIJ : L1, L4, L5/S1, and R SIJ Segmental Joint(s) Thoracic : T3, T4, T5, T9, and T12   Treatment  Techniques Used : Direct Non-force technique, Activator/Tool assisted technique, and Low force    Soft-tissue mobilization was performed in the following areas:   Cervical paraspinal mm. bilateral and Upper Trapezius bilateral  Middle Trapezius bilateral, Rhomboids bilateral, and Pectoralis bilateral  Lumbar Paraspinal mm. bilateral, Quadratus Lumborum bilateral, and Gluteal mm. Glute. Med. bilateral    F/U in 4-6 weeks    The patient tolerated today's treatment with little or no additional discomfort and was instructed to contact the office for questions or concerns.

## 2025-07-24 ENCOUNTER — HOSPITAL ENCOUNTER (OUTPATIENT)
Dept: RADIOLOGY | Facility: CLINIC | Age: 85
Discharge: HOME | End: 2025-07-24
Payer: MEDICARE

## 2025-07-24 DIAGNOSIS — Z78.0 ASYMPTOMATIC MENOPAUSE: ICD-10-CM

## 2025-07-24 PROCEDURE — 77080 DXA BONE DENSITY AXIAL: CPT | Performed by: RADIOLOGY

## 2025-07-24 PROCEDURE — 77080 DXA BONE DENSITY AXIAL: CPT

## 2025-08-13 ENCOUNTER — APPOINTMENT (OUTPATIENT)
Dept: INTEGRATIVE MEDICINE | Facility: CLINIC | Age: 85
End: 2025-08-13
Payer: MEDICARE

## 2025-08-13 DIAGNOSIS — M99.01 SEGMENTAL DYSFUNCTION OF CERVICAL REGION: ICD-10-CM

## 2025-08-13 DIAGNOSIS — M53.3 SACROILIAC DYSFUNCTION: ICD-10-CM

## 2025-08-13 DIAGNOSIS — M54.59 MECHANICAL LOW BACK PAIN: ICD-10-CM

## 2025-08-13 DIAGNOSIS — M99.03 SEGMENTAL AND SOMATIC DYSFUNCTION OF LUMBAR REGION: Primary | ICD-10-CM

## 2025-08-13 DIAGNOSIS — M99.02 SEGMENTAL AND SOMATIC DYSFUNCTION OF THORACIC REGION: ICD-10-CM

## 2025-08-13 DIAGNOSIS — M79.9 POSTURAL STRAIN: ICD-10-CM

## 2025-08-13 PROCEDURE — 98941 CHIROPRACT MANJ 3-4 REGIONS: CPT | Performed by: CHIROPRACTOR

## 2025-09-17 ENCOUNTER — APPOINTMENT (OUTPATIENT)
Dept: INTEGRATIVE MEDICINE | Facility: CLINIC | Age: 85
End: 2025-09-17
Payer: MEDICARE

## 2025-10-16 ENCOUNTER — APPOINTMENT (OUTPATIENT)
Dept: INTEGRATIVE MEDICINE | Facility: CLINIC | Age: 85
End: 2025-10-16
Payer: MEDICARE

## 2025-10-29 ENCOUNTER — APPOINTMENT (OUTPATIENT)
Dept: PRIMARY CARE | Facility: CLINIC | Age: 85
End: 2025-10-29
Payer: MEDICARE

## 2025-11-20 ENCOUNTER — APPOINTMENT (OUTPATIENT)
Dept: INTEGRATIVE MEDICINE | Facility: CLINIC | Age: 85
End: 2025-11-20
Payer: MEDICARE

## 2026-05-01 ENCOUNTER — APPOINTMENT (OUTPATIENT)
Dept: PRIMARY CARE | Facility: CLINIC | Age: 86
End: 2026-05-01
Payer: MEDICARE